# Patient Record
Sex: MALE | Race: WHITE | NOT HISPANIC OR LATINO | Employment: OTHER | URBAN - METROPOLITAN AREA
[De-identification: names, ages, dates, MRNs, and addresses within clinical notes are randomized per-mention and may not be internally consistent; named-entity substitution may affect disease eponyms.]

---

## 2019-02-17 ENCOUNTER — HOSPITAL ENCOUNTER (INPATIENT)
Facility: HOSPITAL | Age: 59
LOS: 1 days | Discharge: ANOTHER HOSPITAL | DRG: 565 | End: 2019-02-18
Attending: EMERGENCY MEDICINE | Admitting: EMERGENCY MEDICINE
Payer: OTHER GOVERNMENT

## 2019-02-17 DIAGNOSIS — C80.1 MALIGNANCY: Primary | ICD-10-CM

## 2019-02-17 DIAGNOSIS — R91.8 LUNG MASS: ICD-10-CM

## 2019-02-17 DIAGNOSIS — M54.50 ACUTE LOW BACK PAIN WITHOUT SCIATICA, UNSPECIFIED BACK PAIN LATERALITY: ICD-10-CM

## 2019-02-17 PROCEDURE — 99285 EMERGENCY DEPT VISIT HI MDM: CPT | Mod: ,,, | Performed by: PHYSICIAN ASSISTANT

## 2019-02-17 PROCEDURE — 99285 EMERGENCY DEPT VISIT HI MDM: CPT | Mod: 25

## 2019-02-17 PROCEDURE — 99285 PR EMERGENCY DEPT VISIT,LEVEL V: ICD-10-PCS | Mod: ,,, | Performed by: PHYSICIAN ASSISTANT

## 2019-02-17 RX ORDER — ZOLPIDEM TARTRATE 5 MG/1
5 TABLET ORAL NIGHTLY PRN
COMMUNITY

## 2019-02-17 RX ORDER — CYCLOBENZAPRINE HCL 5 MG
5 TABLET ORAL 3 TIMES DAILY PRN
COMMUNITY

## 2019-02-18 VITALS
OXYGEN SATURATION: 99 % | SYSTOLIC BLOOD PRESSURE: 192 MMHG | HEIGHT: 72 IN | BODY MASS INDEX: 31.83 KG/M2 | WEIGHT: 235 LBS | RESPIRATION RATE: 18 BRPM | HEART RATE: 90 BPM | DIASTOLIC BLOOD PRESSURE: 122 MMHG | TEMPERATURE: 99 F

## 2019-02-18 PROBLEM — R91.8 MASS OF RIGHT LUNG: Status: ACTIVE | Noted: 2019-02-18

## 2019-02-18 PROBLEM — C80.1 MALIGNANCY: Status: ACTIVE | Noted: 2019-02-18

## 2019-02-18 PROBLEM — M54.9 BACK PAIN: Status: ACTIVE | Noted: 2019-02-18

## 2019-02-18 PROBLEM — E87.1 HYPONATREMIA: Status: ACTIVE | Noted: 2019-02-18

## 2019-02-18 LAB
ABO + RH BLD: NORMAL
ALBUMIN SERPL BCP-MCNC: 3 G/DL
ALP SERPL-CCNC: 88 U/L
ALT SERPL W/O P-5'-P-CCNC: 22 U/L
ANION GAP SERPL CALC-SCNC: 9 MMOL/L
ANION GAP SERPL CALC-SCNC: 9 MMOL/L
APTT BLDCRRT: 22.9 SEC
APTT BLDCRRT: 23.8 SEC
AST SERPL-CCNC: 24 U/L
BASOPHILS # BLD AUTO: 0.07 K/UL
BASOPHILS NFR BLD: 0.6 %
BILIRUB SERPL-MCNC: 0.7 MG/DL
BLD GP AB SCN CELLS X3 SERPL QL: NORMAL
BNP SERPL-MCNC: 16 PG/ML
BUN SERPL-MCNC: 14 MG/DL
BUN SERPL-MCNC: 15 MG/DL
CALCIUM SERPL-MCNC: 8.9 MG/DL
CALCIUM SERPL-MCNC: 8.9 MG/DL
CHLORIDE SERPL-SCNC: 100 MMOL/L
CHLORIDE SERPL-SCNC: 100 MMOL/L
CK SERPL-CCNC: 305 U/L
CO2 SERPL-SCNC: 22 MMOL/L
CO2 SERPL-SCNC: 22 MMOL/L
CREAT SERPL-MCNC: 0.8 MG/DL
CREAT SERPL-MCNC: 0.8 MG/DL
DIFFERENTIAL METHOD: ABNORMAL
EOSINOPHIL # BLD AUTO: 0.1 K/UL
EOSINOPHIL NFR BLD: 0.5 %
ERYTHROCYTE [DISTWIDTH] IN BLOOD BY AUTOMATED COUNT: 13.2 %
EST. GFR  (AFRICAN AMERICAN): >60 ML/MIN/1.73 M^2
EST. GFR  (AFRICAN AMERICAN): >60 ML/MIN/1.73 M^2
EST. GFR  (NON AFRICAN AMERICAN): >60 ML/MIN/1.73 M^2
EST. GFR  (NON AFRICAN AMERICAN): >60 ML/MIN/1.73 M^2
ESTIMATED AVG GLUCOSE: 108 MG/DL
FOLATE SERPL-MCNC: 11.8 NG/ML
GLUCOSE SERPL-MCNC: 88 MG/DL
GLUCOSE SERPL-MCNC: 96 MG/DL
HBA1C MFR BLD HPLC: 5.4 %
HCT VFR BLD AUTO: 41.7 %
HGB BLD-MCNC: 13.5 G/DL
IMM GRANULOCYTES # BLD AUTO: 0.05 K/UL
IMM GRANULOCYTES NFR BLD AUTO: 0.4 %
INR PPP: 1
INR PPP: 1
LACTATE SERPL-SCNC: 1.1 MMOL/L
LYMPHOCYTES # BLD AUTO: 1.9 K/UL
LYMPHOCYTES NFR BLD: 15.2 %
MCH RBC QN AUTO: 27.6 PG
MCHC RBC AUTO-ENTMCNC: 32.4 G/DL
MCV RBC AUTO: 85 FL
MONOCYTES # BLD AUTO: 0.8 K/UL
MONOCYTES NFR BLD: 6.1 %
NEUTROPHILS # BLD AUTO: 9.5 K/UL
NEUTROPHILS NFR BLD: 77.2 %
NRBC BLD-RTO: 0 /100 WBC
OSMOLALITY SERPL: 278 MOSM/KG
PLATELET # BLD AUTO: 242 K/UL
PMV BLD AUTO: 10.3 FL
POCT GLUCOSE: 96 MG/DL (ref 70–110)
POTASSIUM SERPL-SCNC: 3.5 MMOL/L
POTASSIUM SERPL-SCNC: 3.8 MMOL/L
PROCALCITONIN SERPL IA-MCNC: 0.03 NG/ML
PROT SERPL-MCNC: 6.8 G/DL
PROTHROMBIN TIME: 10.1 SEC
PROTHROMBIN TIME: 10.2 SEC
RBC # BLD AUTO: 4.9 M/UL
SODIUM SERPL-SCNC: 131 MMOL/L
SODIUM SERPL-SCNC: 131 MMOL/L
SODIUM UR-SCNC: 54 MMOL/L
TSH SERPL DL<=0.005 MIU/L-ACNC: 3.44 UIU/ML
VIT B12 SERPL-MCNC: 497 PG/ML
WBC # BLD AUTO: 12.24 K/UL

## 2019-02-18 PROCEDURE — 82550 ASSAY OF CK (CPK): CPT

## 2019-02-18 PROCEDURE — 93005 ELECTROCARDIOGRAM TRACING: CPT

## 2019-02-18 PROCEDURE — 82746 ASSAY OF FOLIC ACID SERUM: CPT

## 2019-02-18 PROCEDURE — 83605 ASSAY OF LACTIC ACID: CPT

## 2019-02-18 PROCEDURE — 80053 COMPREHEN METABOLIC PANEL: CPT

## 2019-02-18 PROCEDURE — 83880 ASSAY OF NATRIURETIC PEPTIDE: CPT

## 2019-02-18 PROCEDURE — 36415 COLL VENOUS BLD VENIPUNCTURE: CPT

## 2019-02-18 PROCEDURE — 99223 1ST HOSP IP/OBS HIGH 75: CPT | Mod: ,,, | Performed by: NEUROLOGICAL SURGERY

## 2019-02-18 PROCEDURE — 20600001 HC STEP DOWN PRIVATE ROOM

## 2019-02-18 PROCEDURE — 97162 PT EVAL MOD COMPLEX 30 MIN: CPT

## 2019-02-18 PROCEDURE — 25500020 PHARM REV CODE 255: Performed by: EMERGENCY MEDICINE

## 2019-02-18 PROCEDURE — 82607 VITAMIN B-12: CPT

## 2019-02-18 PROCEDURE — 84443 ASSAY THYROID STIM HORMONE: CPT

## 2019-02-18 PROCEDURE — 83930 ASSAY OF BLOOD OSMOLALITY: CPT

## 2019-02-18 PROCEDURE — A9585 GADOBUTROL INJECTION: HCPCS | Performed by: EMERGENCY MEDICINE

## 2019-02-18 PROCEDURE — 93010 EKG 12-LEAD: ICD-10-PCS | Mod: ,,, | Performed by: INTERNAL MEDICINE

## 2019-02-18 PROCEDURE — 99223 PR INITIAL HOSPITAL CARE,LEVL III: ICD-10-PCS | Mod: ,,, | Performed by: INTERNAL MEDICINE

## 2019-02-18 PROCEDURE — 80048 BASIC METABOLIC PNL TOTAL CA: CPT

## 2019-02-18 PROCEDURE — 85025 COMPLETE CBC W/AUTO DIFF WBC: CPT

## 2019-02-18 PROCEDURE — 85730 THROMBOPLASTIN TIME PARTIAL: CPT

## 2019-02-18 PROCEDURE — 25000003 PHARM REV CODE 250: Performed by: STUDENT IN AN ORGANIZED HEALTH CARE EDUCATION/TRAINING PROGRAM

## 2019-02-18 PROCEDURE — 82962 GLUCOSE BLOOD TEST: CPT

## 2019-02-18 PROCEDURE — 85610 PROTHROMBIN TIME: CPT

## 2019-02-18 PROCEDURE — 93010 ELECTROCARDIOGRAM REPORT: CPT | Mod: ,,, | Performed by: INTERNAL MEDICINE

## 2019-02-18 PROCEDURE — 86901 BLOOD TYPING SEROLOGIC RH(D): CPT

## 2019-02-18 PROCEDURE — 85730 THROMBOPLASTIN TIME PARTIAL: CPT | Mod: 91

## 2019-02-18 PROCEDURE — 84300 ASSAY OF URINE SODIUM: CPT

## 2019-02-18 PROCEDURE — 99223 PR INITIAL HOSPITAL CARE,LEVL III: ICD-10-PCS | Mod: ,,, | Performed by: NEUROLOGICAL SURGERY

## 2019-02-18 PROCEDURE — 83036 HEMOGLOBIN GLYCOSYLATED A1C: CPT

## 2019-02-18 PROCEDURE — 84145 PROCALCITONIN (PCT): CPT

## 2019-02-18 PROCEDURE — 99223 1ST HOSP IP/OBS HIGH 75: CPT | Mod: ,,, | Performed by: INTERNAL MEDICINE

## 2019-02-18 PROCEDURE — 85610 PROTHROMBIN TIME: CPT | Mod: 91

## 2019-02-18 RX ORDER — HYDRALAZINE HYDROCHLORIDE 25 MG/1
25 TABLET, FILM COATED ORAL ONCE
Status: COMPLETED | OUTPATIENT
Start: 2019-02-18 | End: 2019-02-18

## 2019-02-18 RX ORDER — ZOLPIDEM TARTRATE 5 MG/1
5 TABLET ORAL NIGHTLY PRN
Status: DISCONTINUED | OUTPATIENT
Start: 2019-02-18 | End: 2019-02-18 | Stop reason: HOSPADM

## 2019-02-18 RX ORDER — GADOBUTROL 604.72 MG/ML
10 INJECTION INTRAVENOUS
Status: COMPLETED | OUTPATIENT
Start: 2019-02-18 | End: 2019-02-18

## 2019-02-18 RX ORDER — IBUPROFEN 200 MG
16 TABLET ORAL
Status: DISCONTINUED | OUTPATIENT
Start: 2019-02-18 | End: 2019-02-18 | Stop reason: HOSPADM

## 2019-02-18 RX ORDER — PROMETHAZINE HYDROCHLORIDE 25 MG/1
25 TABLET ORAL EVERY 6 HOURS PRN
Status: DISCONTINUED | OUTPATIENT
Start: 2019-02-18 | End: 2019-02-18 | Stop reason: HOSPADM

## 2019-02-18 RX ORDER — ONDANSETRON 8 MG/1
8 TABLET, ORALLY DISINTEGRATING ORAL EVERY 8 HOURS PRN
Status: DISCONTINUED | OUTPATIENT
Start: 2019-02-18 | End: 2019-02-18 | Stop reason: HOSPADM

## 2019-02-18 RX ORDER — SODIUM CHLORIDE 0.9 % (FLUSH) 0.9 %
5 SYRINGE (ML) INJECTION
Status: CANCELLED | OUTPATIENT
Start: 2019-02-18

## 2019-02-18 RX ORDER — IBUPROFEN 200 MG
24 TABLET ORAL
Status: DISCONTINUED | OUTPATIENT
Start: 2019-02-18 | End: 2019-02-18 | Stop reason: HOSPADM

## 2019-02-18 RX ORDER — ACETAMINOPHEN 325 MG/1
650 TABLET ORAL EVERY 4 HOURS PRN
Status: DISCONTINUED | OUTPATIENT
Start: 2019-02-18 | End: 2019-02-18 | Stop reason: HOSPADM

## 2019-02-18 RX ORDER — ENOXAPARIN SODIUM 100 MG/ML
40 INJECTION SUBCUTANEOUS EVERY 24 HOURS
Status: DISCONTINUED | OUTPATIENT
Start: 2019-02-18 | End: 2019-02-18 | Stop reason: HOSPADM

## 2019-02-18 RX ORDER — GLUCAGON 1 MG
1 KIT INJECTION
Status: DISCONTINUED | OUTPATIENT
Start: 2019-02-18 | End: 2019-02-18 | Stop reason: HOSPADM

## 2019-02-18 RX ORDER — LIDOCAINE 50 MG/G
1 PATCH TOPICAL
COMMUNITY

## 2019-02-18 RX ORDER — SODIUM CHLORIDE 0.9 % (FLUSH) 0.9 %
5 SYRINGE (ML) INJECTION
Status: DISCONTINUED | OUTPATIENT
Start: 2019-02-18 | End: 2019-02-18 | Stop reason: HOSPADM

## 2019-02-18 RX ORDER — CYCLOBENZAPRINE HCL 5 MG
5 TABLET ORAL 3 TIMES DAILY PRN
Status: DISCONTINUED | OUTPATIENT
Start: 2019-02-18 | End: 2019-02-18 | Stop reason: HOSPADM

## 2019-02-18 RX ORDER — OXYCODONE HYDROCHLORIDE 5 MG/1
5 TABLET ORAL EVERY 6 HOURS PRN
Status: DISCONTINUED | OUTPATIENT
Start: 2019-02-18 | End: 2019-02-18 | Stop reason: HOSPADM

## 2019-02-18 RX ORDER — ACETAMINOPHEN 500 MG
500 TABLET ORAL DAILY PRN
COMMUNITY

## 2019-02-18 RX ADMIN — GADOBUTROL 10 ML: 604.72 INJECTION INTRAVENOUS at 03:02

## 2019-02-18 RX ADMIN — HYDRALAZINE HYDROCHLORIDE 25 MG: 25 TABLET ORAL at 07:02

## 2019-02-18 RX ADMIN — OXYCODONE HYDROCHLORIDE 5 MG: 5 TABLET ORAL at 05:02

## 2019-02-18 NOTE — PLAN OF CARE
Patient admitted to unit from Emergency Room alter and oriented x 4 in stable condition. Pt reports lower backache but declined intervention at this time. Plan is to transfer patient to [facility] once this is set up. Up with assist x 2 patient very unsteady on feet, knees will sometimes buckle and he has issues lifting his feet to walk at this time r/t disease. Pt has remained free from injury this shift. Bed in low locked position. Call light and personal belongings within reach. Side rails up x2. Nonskid socks in place. Pt instructed to call with any needs. Will continue to monitor.     Addendum: Hydralazine adm per ords x 1; patient transported in stable condition to outside facility. Report called to [facility] [phone].

## 2019-02-18 NOTE — HOSPITAL COURSE
Patient was seen and evaluated by NSGY [facility] and plan is: No acute neurosurgical intervention necessary as the MRI L spine obtained at [facility] was without compressive lesion.  Patient also seen and evaluated by Pulmonology expressed his wish to resume care for his pulmonary mass with biopsy in [place]. The patient was then transferred back to his original hospital..

## 2019-02-18 NOTE — ASSESSMENT & PLAN NOTE
Incidental lung mass identified on CT-spine at OSH 1 week ago, xfer to [facility] for NSGY eval in the setting of acute onset b/l lower limb paresthesia and weakness  - Paraspinal L3 lesion noted on MRI-spine, NSGY states no current concern for cord compression  - 6mm R iliac enhancing osseous lesion also noted on MRI  - Not yet biopsy proven cancer dx, though high concern for SCLC  - If cord compression has been ruled out, lower limb symptomatology may be 2/2 paraneoplastic syndrome    A/P  - R-sided lung mass, likely SCLC primary vs other malignancy  - Pt will requiring dedicated CT chest for further characterization of mass and lung biopsy planning, peripheral iliac lesion too small for biopsy  - In light of patient's  health coverage, follow up testing already scheduled in [place] with his primary doctors (biopsy planned for this Wednesday), and patient's preference to be treated closer to home, pt will return to [place] for workup and tx of his right lung mass once medically cleared by nsgy and primary to do so.  - Consider Lambert-Eaton Myasthenic syndrome as ddx of neurological symptoms in absence of cord compressing lesion

## 2019-02-18 NOTE — PT/OT/SLP EVAL
Physical Therapy Evaluation    Patient Name:  Gurinder Tee   MRN:  [id number]    Recommendations:     Discharge Recommendations:  rehabilitation facility   Discharge Equipment Recommendations: walker, rolling   Barriers to discharge: None    Assessment:     Gurinder Tee is a 59 y.o. male admitted with a medical diagnosis of Back pain.  He presents with the following impairments/functional limitations:  weakness, impaired endurance, gait instability, impaired functional mobilty, decreased coordination, impaired balance, decreased lower extremity function, impaired self care skills, impaired sensation, decreased safety awareness, impaired cardiopulmonary response to activity.    During today's evaluation, pt with poor coordination and sensation effecting pt stability during gait. Pt with uncoordinated gait pattern and heavy reliance on RW for stability. Prior to admit, pt (I) with mobility and self care; still working for [employer]. Currently pt is high fall risk and would benefit from intensive rehabilitation program to address deficits and to improve return to PLOF.     Rehab Prognosis: Good; patient would benefit from acute skilled PT services to address these deficits and reach maximum level of function.    Recent Surgery: * No surgery found *      Plan:     During this hospitalization, patient to be seen 4 x/week to address the identified rehab impairments via therapeutic activities, therapeutic exercises, neuromuscular re-education, gait training and progress toward the following goals:    · Plan of Care Expires:  03/20/19    Subjective     Chief Complaint: lower back pain   Patient/Family Comments/goals: to improve mobility  Pain/Comfort:  · Pain Rating 1: (did not rate)  · Location - Orientation 1: lower  · Location 1: back  · Pain Addressed 1: Reposition, Distraction    Patients cultural, spiritual, Denominational conflicts given the current situation: no    Living Environment:  Patient History:  · Home  environment: lives with wife in 2 story home with no SHERRI; able to live on 1st level  · Assistance provided:  wife      Previous Level of Mobilty  · Transfers: (I)  · Gait: (I) for household and community distances    Additional Roles and Hx of Patient  · Working: yes; [employer] desk job  · Driving: yes  · Hobbies:did not state  · Hearing or Vision Deficit: wears glasses; no hearing deficit  · Hx of Falls: 1 prior to admit    DME: none  - Bold implies equipment being used    Objective:     Communicated with nsg prior to session.  Patient found supine in bed peripheral IV  upon PT entry to room.    General Precautions: Standard, fall   Orthopedic Precautions:N/A   Braces: N/A       Exams:  Cognitive Exam  Patient is oriented to Person, Place, Time and Situation and follows 100 % of multi-step commands    Fine Motor Coordination    -       Impaired  RLE heel shin moderate and LLE heel shin mild   Postural Exam Patient presented with the following abnormalities:    -       Rounded shoulders   Sensation    -       Intact  light/touch to proximal of (B) LE  -       Impaired  light/touch to distal of (B) LE specifically feet   Skin Integrity/Edema     -       Skin integrity: Visible skin intact   R LE ROM WFL   R LE Strength  3+/5 hip flexion, 4+/5 knee ext, 4/5 knee flex, 3+/5 DF   L LE ROM WFL   L LE Strength  4-/5 hip flexion, 4/5 knee ext, 4+/5 knee flex and ankle DF       Functional Mobility  Bed Mobility  Supine to Sit: contact guard assistance   Sit to Supine: contact guard assistance   Transfers Sit to Stand:  contact guard assistance and minimum assistance with rolling walker for 4 trials  - 1 trial MIN A, 3 trials CGA; heavy reliance on RW      Gait Gait Distance: 5 ft, 20 ft x 2 with RW  Assistance Level: minimum assistance  Description: incr keke with (B) foot slap occurring and pt naturally with narrow base of support. Cueing provided to incr weight-shifting and base of support. Cueing also provided for  improved coordination of placement of feet. Heavy reliance on RW while ambulating to offset weakness         Balance   Static Sitting stand by assistance   Dynamic Sitting contact guard assistance   Static Standing minimum assistance   PT assessed multiple higher level balance activities  - perturbations (FWD, BWD, sideways)  - feet together eyes closed  - narrow base of support  - *LOB denoted with bolded items  CGA req for all activities for safety      Dynamic Standing minimum assistance         Therapeutic Activities and Exercises:    PT educated pt on the following  - role of PT  - PT POC (including frequency and duration while in hospital)  - discharge recommendation (IP REHAB) and equipment needs (RW)  - level of assistance currently req (1 person )and safety precautions with [facility] staff   All questions and concerns answered and addressed. White board updated with pertinent information. Nsg notified.       AM-PAC 6 CLICK MOBILITY  Total Score:16     Patient left supine with all lines intact and call button in reach.    GOALS:   Multidisciplinary Problems     Physical Therapy Goals        Problem: Physical Therapy Goal    Goal Priority Disciplines Outcome Goal Variances Interventions   Physical Therapy Goal     PT, PT/OT Ongoing (interventions implemented as appropriate)     Description:  Goals to be met by: 10 days ()    Patient will increase functional independence with mobility by performin. Supine to sit with Modified [place]  2. Sit to supine with Modified [place]  3. Sit to stand transfer with Supervision  4. Gait  x 200 feet with Supervision using Rolling Walker.   5. Lower extremity exercise program x30 reps per handout, with independence to improve muscular strength and endurance.                       History:     Past Medical History:   Diagnosis Date    Asthma        History reviewed. No pertinent surgical history.    Time Tracking:     PT Received On: 19  PT Start Time:  1100     PT Stop Time: 1120  PT Total Time (min): 20 min     Billable Minutes: Evaluation 20      Melissa Reynaga, PT  02/18/2019

## 2019-02-18 NOTE — CONSULTS
"[facility]  Pulmonology  Consult Note    Patient Name: Gruinder Tee  MRN: [id number]  Admission Date: 2/17/2019  Hospital Length of Stay: 0 days  Code Status: No Order  Attending Physician: Quinton Louie MD  Primary Care Provider: Primary Doctor No   Principal Problem: Back pain    Consults  Subjective:     HPI:  59M who is a 15 pack-yr ex-smoker (quit 20 yrs ago), otherwise no PMHx, presented to OSH 1wk ago for evaluation of 2wk hx back pain/spasms. A CT-spine and MRI-spine were performed and demonstrated an incidental R eleazar-hilar lung mass with L3 spinal lesion concerning for lung cancer with bony metastasis. He has been transferred from [place], [place] for NSGY evaluation of spinal lesion with new onset progressive lower limb paraesthesia and weakness concerning for spinal cord compression. Pt maintains full control of bowel and bladder, though does endorse numbness of legs b/l, and partial paresthesia extending proximally to L3 with difficulty ambulating. Denies any respiratory symptoms, f/c, night sweats, or weight loss, though had a few day course of "bronchitis" with intermittent cough a couple weeks ago that has since fully resolved. Pt has not yet received biopsy to confirm dx.     Pulmonary consulted for new diagnosis lung cancer    Past Medical History:   Diagnosis Date    Asthma        History reviewed. No pertinent surgical history.    Review of patient's allergies indicates:   Allergen Reactions    Penicillins Rash       Family History     Problem Relation (Age of Onset)    Cancer Father    Heart disease Father        Tobacco Use    Smoking status: Former Smoker     Types: Cigarettes    Smokeless tobacco: Never Used    Tobacco comment: quit 20 yrs ago   Substance and Sexual Activity    Alcohol use: Yes     Comment: socially    Drug use: No    Sexual activity: Not on file         Review of Systems   Constitutional: Negative for chills and fever.   HENT: Negative for " congestion, postnasal drip, rhinorrhea and sore throat.    Eyes: Negative for photophobia and visual disturbance.   Respiratory: Negative for cough, chest tightness, shortness of breath, wheezing and stridor.    Cardiovascular: Negative for chest pain, palpitations and leg swelling.   Gastrointestinal: Negative for abdominal distention, abdominal pain, constipation, diarrhea, nausea and vomiting.   Endocrine: Negative for polyuria.   Genitourinary: Negative for decreased urine volume, difficulty urinating, dysuria, flank pain, hematuria and urgency.   Musculoskeletal: Positive for back pain. Negative for myalgias, neck pain and neck stiffness.   Skin: Negative for color change and rash.   Neurological: Positive for weakness and numbness. Negative for dizziness, syncope, light-headedness and headaches.   Hematological: Does not bruise/bleed easily.   Psychiatric/Behavioral: Negative for agitation, confusion and decreased concentration.     Objective:     Vital Signs (Most Recent):  Temp: 98 °F (36.7 °C) (02/18/19 0908)  Pulse: 85 (02/18/19 0908)  Resp: 18 (02/18/19 0908)  BP: (!) 171/96 (02/18/19 0908)  SpO2: 100 % (02/18/19 0908) Vital Signs (24h Range):  Temp:  [98 °F (36.7 °C)-98.7 °F (37.1 °C)] 98 °F (36.7 °C)  Pulse:  [78-86] 85  Resp:  [16-18] 18  SpO2:  [95 %-100 %] 100 %  BP: (119-182)/() 171/96     Weight: 106.6 kg (235 lb)  Body mass index is 31.87 kg/m².    No intake or output data in the 24 hours ending 02/18/19 0920    Physical Exam   Constitutional: He is oriented to person, place, and time. He appears well-developed and well-nourished. No distress.   HENT:   Head: Normocephalic and atraumatic.   Eyes: Conjunctivae are normal. No scleral icterus.   Neck: Normal range of motion. Neck supple. No JVD present.   Cardiovascular: Normal rate, regular rhythm, normal heart sounds and intact distal pulses.   Pulmonary/Chest: Effort normal and breath sounds normal. No respiratory distress. He has no  wheezes.   Abdominal: Soft. Bowel sounds are normal.   Musculoskeletal: He exhibits tenderness. He exhibits no edema.   Neurological: He is alert and oriented to person, place, and time. A sensory deficit is present.   Skin: Skin is warm and dry. He is not diaphoretic.   Vitals reviewed.      Vents:       Lines/Drains/Airways     Peripheral Intravenous Line                 Peripheral IV - Single Lumen 02/18/19 0100 Left Antecubital less than 1 day                Significant Labs:    CBC/Anemia Profile:  Recent Labs   Lab 02/18/19  0139 02/18/19  0557   WBC 12.24  --    HGB 13.5*  --    HCT 41.7  --      --    MCV 85  --    RDW 13.2  --    FOLATE  --  11.8   [id number]  --  497        Chemistries:  Recent Labs   Lab 02/18/19  0139   *   K 3.5      CO2 22*   BUN 15   CREATININE 0.8   CALCIUM 8.9   ALBUMIN 3.0*   PROT 6.8   BILITOT 0.7   ALKPHOS 88   ALT 22   AST 24       Recent Lab Results       02/18/19  0650   02/18/19  0602   02/18/19  0557   02/18/19 0139        Immature Granulocytes       0.4     Immature Grans (Abs)       0.05  Comment:  Mild elevation in immature granulocytes is non specific and   can be seen in a variety of conditions including stress response,   acute inflammation, trauma and pregnancy. Correlation with other   laboratory and clinical findings is essential.       Procalcitonin     0.03  Comment:  A concentration < 0.25 ng/mL represents a low risk bacterial   infection.  Procalcitonin may not be accurate among patients with localized   infection, recent trauma or major surgery, immunosuppressed state,   invasive fungal infection, renal dysfunction. Decisions regarding   initiation or continuation of antibiotic therapy should not be based   solely on procalcitonin levels.         Albumin       3.0     Alkaline Phosphatase       88     ALT       22     Anion Gap       9     aPTT     23.8  Comment:  aPTT therapeutic range = 39-69 seconds 22.9  Comment:  aPTT therapeutic range  = 39-69 seconds     AST       24     Baso #       0.07     Basophil%       0.6     Total Bilirubin       0.7  Comment:  For infants and newborns, interpretation of results should be based  on gestational age, weight and in agreement with clinical  observations.  Premature Infant recommended reference ranges:  Up to 24 hours.............<8.0 mg/dL  Up to 48 hours............<12.0 mg/dL  3-5 days..................<15.0 mg/dL  6-29 days.................<15.0 mg/dL       BNP     16  Comment:  Values of less than 100 pg/ml are consistent with non-CHF populations.       BUN, Bld       15     Calcium       8.9     Chloride       100     CO2       22     CPK     305       Creatinine       0.8     Differential Method       Automated     eGFR if        >60.0     eGFR if non        >60.0  Comment:  Calculation used to obtain the estimated glomerular filtration  rate (eGFR) is the CKD-EPI equation.        Eos #       0.1     Eosinophil%       0.5     Estimated Avg Glucose     108       Folate     11.8       Glucose       96     Gran # (ANC)       9.5     Gran%       77.2     Group & Rh       A POS     Hematocrit       41.7     Hemoglobin       13.5     Hemoglobin A1C External     5.4  Comment:  ADA Screening Guidelines:  5.7-6.4%  Consistent with prediabetes  >or=6.5%  Consistent with diabetes  High levels of fetal hemoglobin interfere with the HbA1C  assay. Heterozygous hemoglobin variants (HbS, HgC, etc)do  not significantly interfere with this assay.   However, presence of multiple variants may affect accuracy.         INDIRECT NIC       NEG     Coumadin Monitoring INR     1.0  Comment:  Coumadin Therapy:  2.0 - 3.0 for INR for all indicators except mechanical heart valves  and antiphospholipid syndromes which should use 2.5 - 3.5.   1.0  Comment:  Coumadin Therapy:  2.0 - 3.0 for INR for all indicators except mechanical heart valves  and antiphospholipid syndromes which should use 2.5 -  3.5.       Lactate, Ryan     1.1  Comment:  Falsely low lactic acid results can be found in samples   containing >=13.0 mg/dL total bilirubin and/or >=3.5 mg/dL   direct bilirubin.         Lymph #       1.9     Lymph%       15.2     MCH       27.6     MCHC       32.4     MCV       85     Mono #       0.8     Mono%       6.1     MPV       10.3     nRBC       0     Osmolality     278       Platelets       242     POCT Glucose   96         Potassium       3.5     Total Protein       6.8     Protime     10.2 10.1     RBC       4.90     RDW       13.2     Sodium       131     Sodium Urine Random 54  Comment:  The random urine reference ranges provided were established   for 24 hour urine collections.  No reference ranges exist for  random urine specimens.  Correlate clinically.             TSH     3.440       Vitamin B-12     497       WBC       12.24         All pertinent labs within the past 24 hours have been reviewed.    Significant Imaging:   I have reviewed all pertinent imaging results/findings within the past 24 hours.    Assessment/Plan:     Mass of right lung    Incidental lung mass identified on CT-spine at OSH 1 week ago, xfer to [facility] for NSGY eval in the setting of acute onset b/l lower limb paresthesia and weakness  - Paraspinal L3 lesion noted on MRI-spine, NSGY states no current concern for cord compression  - 6mm R iliac enhancing osseous lesion also noted on MRI  - Not yet biopsy proven cancer dx, though high concern for SCLC  - If cord compression has been ruled out, lower limb symptomatology may be 2/2 paraneoplastic syndrome    A/P  - R-sided lung mass, likely SCLC primary vs other malignancy  - Pt will requiring dedicated CT chest for further characterization of mass and lung biopsy planning, peripheral iliac lesion too small for biopsy  - In light of patient's  health coverage, follow up testing already scheduled in [place] with his primary doctors (biopsy planned for this Wednesday), and  patient's preference to be treated closer to home, pt will return to [place] for workup and tx of his right lung mass once medically cleared by nsgy and primary to do so.  - Consider Lambert-Eaton Myasthenic syndrome as ddx of neurological symptoms in absence of cord compressing lesion           Thank you for your consult. I will sign off. Please contact us if you have any additional questions.     Nicola Gustafson MD  Pulmonology  [facility]

## 2019-02-18 NOTE — H&P
[facility] Hospital Medicine  History & Physical    Patient Name: Gurinder Tee  MRN: [id number]  Admission Date: 2019  Attending Physician: Qiunton Louie MD   Primary Care Provider: No primary care provider on file.    Hospital Medicine Team: [facility] MED 3 Keeley Bajwa MD     Patient information was obtained from patient, past medical records and ER records.     Subjective:     Principal Problem:Back pain    Chief Complaint:   Chief Complaint   Patient presents with    Fatigue     Patient transferred from [facility] . Pt diagnosed with Lung Ca with mets 1 week ago. Pt also has an L3 mass        HPI: 59-year-old male with no known past medical history who presents as a transfer from [facility] in [place] for neurosurgical evaluation. Patient reports he began having lower back spasms starting 1-2 weeks ago; he had a CT of his spine which revealed an incidental right lung mass. He was given Tramadol and Flexeril for back pain. He states on Tuesday (6 days ago) he was attending his follow-up consultation with a pulmonologist when he noted gait instability and lower extremity numbness. An MRI of the spine was done demonstrating a lesion at L3. He states since then he has required more assistance with ambulation and has numbness from the lumbar spine to his toes. Now he can not stand without holding onto something. He denies arm weakness or numbness, bowel/bladder incontinence, back pain, facial droop, speech difficulty, chest pain, nausea, vomiting, or dysuria.    Smoking History:  Patient quitted smoking for 20 years. He has been a smoker for 20 years    Family History:  His maternal grandfather  of lung cancer.    History reviewed. No pertinent past medical history.    History reviewed. No pertinent surgical history.    Review of patient's allergies indicates:   Allergen Reactions    Penicillins Rash       No current facility-administered medications on file prior to encounter.       Current Outpatient Medications on File Prior to Encounter   Medication Sig    cyclobenzaprine (FLEXERIL) 5 MG tablet Take 5 mg by mouth 3 (three) times daily as needed for Muscle spasms.    tramadol (ULTRAM) 25 mg tablet Take by mouth every 6 (six) hours as needed for Pain.    zolpidem (AMBIEN) 5 MG Tab Take 5 mg by mouth nightly as needed.     Family History     None        Tobacco Use    Smoking status: Former Smoker     Types: Cigarettes    Tobacco comment: quit 20 yrs ago   Substance and Sexual Activity    Alcohol use: Yes     Comment: socially    Drug use: No    Sexual activity: Not on file     Review of Systems   Constitutional: Positive for activity change and fatigue. Negative for appetite change, chills, diaphoresis, fever and unexpected weight change.   HENT: Negative for sore throat and trouble swallowing.    Eyes: Negative for photophobia, pain, discharge, redness, itching and visual disturbance.   Respiratory: Negative for cough, choking, chest tightness, shortness of breath, wheezing and stridor.    Cardiovascular: Negative for chest pain and leg swelling.   Gastrointestinal: Positive for constipation. Negative for abdominal pain, diarrhea, nausea and vomiting.   Genitourinary: Negative for dysuria and hematuria.        Has the urge to pass urine. Takes longer than usual to pass urine   Neurological: Positive for dizziness, weakness, light-headedness and numbness. Negative for seizures, syncope, speech difficulty and headaches.   Psychiatric/Behavioral: Negative for confusion.     Objective:     Vital Signs (Most Recent):  Temp: 98 °F (36.7 °C) (02/17/19 2301)  Pulse: 78 (02/18/19 0141)  BP: (!) 119/90 (02/17/19 2337)  SpO2: 95 % (02/18/19 0141) Vital Signs (24h Range):  Temp:  [98 °F (36.7 °C)-98.7 °F (37.1 °C)] 98 °F (36.7 °C)  Pulse:  [78-86] 78  Resp:  [16] 16  SpO2:  [95 %-100 %] 95 %  BP: (119-168)/(90-98) 119/90        There is no height or weight on file to calculate  BMI.    Physical Exam   Constitutional: He is oriented to person, place, and time. He appears well-developed and well-nourished. No distress.   HENT:   Head: Normocephalic and atraumatic.   Eyes: Right eye exhibits no discharge. Left eye exhibits no discharge.   Neck: Neck supple. No JVD present.   Cardiovascular: Normal rate and regular rhythm. Exam reveals no gallop and no friction rub.   No murmur heard.  Pulmonary/Chest: Effort normal and breath sounds normal. No stridor. No respiratory distress. He has no wheezes. He has no rales. He exhibits no tenderness.   Abdominal: Soft. Bowel sounds are normal. There is no tenderness.   Musculoskeletal: He exhibits no edema, tenderness or deformity.   Neurological: He is alert and oriented to person, place, and time.   Hypoesthesia noted from the level of the lumbar spine to bilateral legs.   No saddle anesthesia.  Normal finger-to-nose, unable to perform heel-to-shin 2/2 weakness.   BLE pronator drift.   5/5 dorsi/plantarflexion, knee flexion bilaterally.   Skin: He is not diaphoretic.   Psychiatric: He has a normal mood and affect. His behavior is normal.           Significant Labs:   A1C:   Recent Labs   Lab 02/18/19  0557   HGBA1C 5.4     Bilirubin:   Recent Labs   Lab 02/18/19  0139   BILITOT 0.7     BMP:   Recent Labs   Lab 02/18/19  0139   GLU 96   *   K 3.5      CO2 22*   BUN 15   CREATININE 0.8   CALCIUM 8.9     CBC:   Recent Labs   Lab 02/18/19 0139   WBC 12.24   HGB 13.5*   HCT 41.7        CMP:   Recent Labs   Lab 02/18/19  0139   *   K 3.5      CO2 22*   GLU 96   BUN 15   CREATININE 0.8   CALCIUM 8.9   PROT 6.8   ALBUMIN 3.0*   BILITOT 0.7   ALKPHOS 88   AST 24   ALT 22   ANIONGAP 9   EGFRNONAA >60.0     Cardiac Markers:   Recent Labs   Lab 02/18/19  0557   BNP 16     Coagulation:   Recent Labs   Lab 02/18/19 0557   INR 1.0   APTT 23.8     Lactic Acid:   Recent Labs   Lab 02/18/19  0557   LACTATE 1.1       Pathology Results   (Last 10 years)    None        POCT Glucose:   Recent Labs   Lab 02/18/19  0602   POCTGLUCOSE 96     TSH:   Recent Labs   Lab 02/18/19  0557   TSH 3.440       Recent Lab Results       02/18/19  0650   02/18/19  0602   02/18/19  0557   02/18/19  0139        Immature Granulocytes       0.4     Immature Grans (Abs)       0.05  Comment:  Mild elevation in immature granulocytes is non specific and   can be seen in a variety of conditions including stress response,   acute inflammation, trauma and pregnancy. Correlation with other   laboratory and clinical findings is essential.       Procalcitonin     0.03  Comment:  A concentration < 0.25 ng/mL represents a low risk bacterial   infection.  Procalcitonin may not be accurate among patients with localized   infection, recent trauma or major surgery, immunosuppressed state,   invasive fungal infection, renal dysfunction. Decisions regarding   initiation or continuation of antibiotic therapy should not be based   solely on procalcitonin levels.         Albumin       3.0     Alkaline Phosphatase       88     ALT       22     Anion Gap       9     aPTT     23.8  Comment:  aPTT therapeutic range = 39-69 seconds 22.9  Comment:  aPTT therapeutic range = 39-69 seconds     AST       24     Baso #       0.07     Basophil%       0.6     Total Bilirubin       0.7  Comment:  For infants and newborns, interpretation of results should be based  on gestational age, weight and in agreement with clinical  observations.  Premature Infant recommended reference ranges:  Up to 24 hours.............<8.0 mg/dL  Up to 48 hours............<12.0 mg/dL  3-5 days..................<15.0 mg/dL  6-29 days.................<15.0 mg/dL       BNP     16  Comment:  Values of less than 100 pg/ml are consistent with non-CHF populations.       BUN, Bld       15     Calcium       8.9     Chloride       100     CO2       22     CPK     305       Creatinine       0.8     Differential Method       Automated     eGFR  if        >60.0     eGFR if non        >60.0  Comment:  Calculation used to obtain the estimated glomerular filtration  rate (eGFR) is the CKD-EPI equation.        Eos #       0.1     Eosinophil%       0.5     Estimated Avg Glucose     108       Glucose       96     Gran # (ANC)       9.5     Gran%       77.2     Group & Rh       A POS     Hematocrit       41.7     Hemoglobin       13.5     Hemoglobin A1C External     5.4  Comment:  ADA Screening Guidelines:  5.7-6.4%  Consistent with prediabetes  >or=6.5%  Consistent with diabetes  High levels of fetal hemoglobin interfere with the HbA1C  assay. Heterozygous hemoglobin variants (HbS, HgC, etc)do  not significantly interfere with this assay.   However, presence of multiple variants may affect accuracy.         INDIRECT NIC       NEG     Coumadin Monitoring INR     1.0  Comment:  Coumadin Therapy:  2.0 - 3.0 for INR for all indicators except mechanical heart valves  and antiphospholipid syndromes which should use 2.5 - 3.5.   1.0  Comment:  Coumadin Therapy:  2.0 - 3.0 for INR for all indicators except mechanical heart valves  and antiphospholipid syndromes which should use 2.5 - 3.5.       Lactate, Ryan     1.1  Comment:  Falsely low lactic acid results can be found in samples   containing >=13.0 mg/dL total bilirubin and/or >=3.5 mg/dL   direct bilirubin.         Lymph #       1.9     Lymph%       15.2     MCH       27.6     MCHC       32.4     MCV       85     Mono #       0.8     Mono%       6.1     MPV       10.3     nRBC       0     Platelets       242     POCT Glucose   96         Potassium       3.5     Total Protein       6.8     Protime     10.2 10.1     RBC       4.90     RDW       13.2     Sodium       131     Sodium Urine Random 54  Comment:  The random urine reference ranges provided were established   for 24 hour urine collections.  No reference ranges exist for  random urine specimens.  Correlate clinically.              TSH     3.440       WBC       12.24         All pertinent labs within the past 24 hours have been reviewed.    Significant Imaging: I have reviewed all pertinent imaging results/findings within the past 24 hours.  I have reviewed and interpreted all pertinent imaging results/findings within the past 24 hours.    Assessment/Plan:     * Back pain    Patient has progressively worsening back pain started 1 week ago that is associated with gait disturbance and numbness of both LEs. Patient has difficulty urination and constipation which could be related to neurological deficit or tramadol use. Patient has incidental finding of right lung mass on imaging,  L3 paraspinal mass and right iliac osseous lesion. Patient has smoking history and positive family history of lung cancer. The mass is concerning for primary lung mass with mets. NSGY was consulted; no intervention.  CXR (02/18/2019): There is abnormal prominent soft tissue opacity involving the right hilum/paramediastinal region concerning for underlying mass. There is patchy bibasilar airspace disease and right pleural effusion with adjacent atelectatic/consolidative changes.    Plan:  - Contact NSGY for the role of steroid due to the prescence of neurological deficits mentioned above. However they stated in their notes that MRI L spine without compressive lesion. They has been contacted and they will call back after discussion.  -  Pulmonology consult for possible bronchoscopy and lung biopsy.  - PRN pain medications and muscle relaxants  - PT/OT       Hyponatremia    Patient is euvolemic in physical exam our differential diagnoses include SIADH due to lung mass. Will send for serum osmolality and urine Na to determine hydration vs fluid restriction. Will monitor his BMP.         VTE Risk Mitigation (From admission, onward)        Ordered     enoxaparin injection 40 mg  Daily      02/18/19 0533     Place sequential compression device  Until discontinued       02/18/19 0533     IP VTE HIGH RISK PATIENT  Once      02/18/19 0533             Keeley Bajwa MD  Department of Hospital Medicine   [facility]

## 2019-02-18 NOTE — ASSESSMENT & PLAN NOTE
Patient has progressively worsening back pain started 1 week ago that is associated with gait disturbance and numbness of both LEs. Patient has difficulty urination and constipation which could be related to neurological deficit or tramadol use. Patient has incidental finding of right lung mass on imaging,  L3 paraspinal mass and right iliac osseous lesion. Patient has smoking history and positive family history of lung cancer. The mass is concerning for primary lung mass with mets. NSGY was consulted; no intervention.  CXR (02/18/2019): There is abnormal prominent soft tissue opacity involving the right hilum/paramediastinal region concerning for underlying mass. There is patchy bibasilar airspace disease and right pleural effusion with adjacent atelectatic/consolidative changes.    Plan:  - Contact NSGY for the role of steroid due to the prescence of neurological deficits mentioned above. However they stated in their notes that MRI L spine without compressive lesion. They has been contacted and they will call back after discussion.  -  Pulmonology consult for possible bronchoscopy and lung biopsy.  - PRN pain medications and muscle relaxants  - PT/OT

## 2019-02-18 NOTE — PLAN OF CARE
Pt PCP is [facility]  No Pharmacies Listed  No future appointments.     02/18/19 1154   Discharge Assessment   Assessment Type Discharge Planning Assessment   Confirmed/corrected address and phone number on facesheet? Yes   Assessment information obtained from? Patient   Expected Length of Stay (days) 3   Communicated expected length of stay with patient/caregiver yes   Prior to hospitilization cognitive status: Alert/Oriented   Prior to hospitalization functional status: Independent   Current cognitive status: Alert/Oriented   Current Functional Status: Independent   Facility Arrived From: [facility]   Lives With spouse   Able to Return to Prior Arrangements yes   Is patient able to care for self after discharge? Yes   Who are your caregiver(s) and their phone number(s)? Betzy Tee  spouse  [phone]   Patient's perception of discharge disposition home or selfcare   Readmission Within the Last 30 Days no previous admission in last 30 days   Patient currently being followed by outpatient case management? No   Patient currently receives any other outside agency services? No   Equipment Currently Used at Home none   Do you have any problems affording any of your prescribed medications? No   Is the patient taking medications as prescribed? yes   Does the patient have transportation home? Yes   Transportation Anticipated family or friend will provide   Discharge Plan A Home with family   Discharge Plan B Home   Patient/Family in Agreement with Plan yes

## 2019-02-18 NOTE — HPI
Patient is a 59 year old male with no past medical history who presents as a transfer.  Patient reports he began having lower back spasms starting 1-2 weeks ago; he had a CT of his spine which revealed an incidental right lung mass. He was given medication for back pain. 6 days ago he was attending his follow-up consultation with a pulmonologist when he noted gait instability and lower extremity numbness. An MRI of the spine was done demonstrating a paraspinal lesion at L3 as well as a pelvic lesion. He states since then he has required more assistance with ambulation and has numbness in both legs. He denies any upper extremity weakness, but does have some patchy nondermatomal numbness. No saddle anesthesia. No bowel or bladder dysfunction.

## 2019-02-18 NOTE — ED NOTES
Pt presents to ED via EMS, tx from [facility] for Neurosurgery consult. Pt states that for about 1 week he has had an unsteady gait, fine tremors in his hands and numbness and tingling to bilateral upper and lower extremities. Pt states that over the week it has become increasingly hard for him to ambulate and if he sits for too long he begins to feel like his quads in both legs are tightening. Pt states that all of these symptoms are acute and that he has no previous PMHx. Pt also endorses not being able to do ADLs that require fine motor skills such as tying his shoe laces or writing his name. Pt also endorses recent severe back pain which prompted him to see his MD. Pt was dx w/ lung cancer 1 week ago and tonight was told that he has a L3 mass. Pt denies blurred/double vision. Pt also denies any respiratory problems such as SOB or wheezing. Pt is AAO x4.

## 2019-02-18 NOTE — PLAN OF CARE
[facility]. I've called multiple #'s w/o any success.    There is a fax # for inpatient medical records. Auth of release of medical records form faxed to , requesting that medical records be faxed back to [facility] station on 8th floor ()    C[place]ELIA w/ Con Martin @ [facility] () ? Verification of correct fax #

## 2019-02-18 NOTE — PLAN OF CARE
Spoke w/ Hannah in Transfer center @ [id number].  Request to start transfer process back to [facility].  Pt transferred here for NSGY eval, no intervention required. Pt would like to resume his care @ [facility].  Hannah given contact info for MD  Ext [phone]

## 2019-02-18 NOTE — SUBJECTIVE & OBJECTIVE
Past Medical History:   Diagnosis Date    Asthma        History reviewed. No pertinent surgical history.    Review of patient's allergies indicates:   Allergen Reactions    Penicillins Rash       Family History     Problem Relation (Age of Onset)    Cancer Father    Heart disease Father        Tobacco Use    Smoking status: Former Smoker     Types: Cigarettes    Smokeless tobacco: Never Used    Tobacco comment: quit 20 yrs ago   Substance and Sexual Activity    Alcohol use: Yes     Comment: socially    Drug use: No    Sexual activity: Not on file         Review of Systems   Constitutional: Negative for chills and fever.   HENT: Negative for congestion, postnasal drip, rhinorrhea and sore throat.    Eyes: Negative for photophobia and visual disturbance.   Respiratory: Negative for cough, chest tightness, shortness of breath, wheezing and stridor.    Cardiovascular: Negative for chest pain, palpitations and leg swelling.   Gastrointestinal: Negative for abdominal distention, abdominal pain, constipation, diarrhea, nausea and vomiting.   Endocrine: Negative for polyuria.   Genitourinary: Negative for decreased urine volume, difficulty urinating, dysuria, flank pain, hematuria and urgency.   Musculoskeletal: Positive for back pain. Negative for myalgias, neck pain and neck stiffness.   Skin: Negative for color change and rash.   Neurological: Positive for weakness and numbness. Negative for dizziness, syncope, light-headedness and headaches.   Hematological: Does not bruise/bleed easily.   Psychiatric/Behavioral: Negative for agitation, confusion and decreased concentration.     Objective:     Vital Signs (Most Recent):  Temp: 98 °F (36.7 °C) (02/18/19 0908)  Pulse: 85 (02/18/19 0908)  Resp: 18 (02/18/19 0908)  BP: (!) 171/96 (02/18/19 0908)  SpO2: 100 % (02/18/19 0908) Vital Signs (24h Range):  Temp:  [98 °F (36.7 °C)-98.7 °F (37.1 °C)] 98 °F (36.7 °C)  Pulse:  [78-86] 85  Resp:  [16-18] 18  SpO2:  [95 %-100 %]  100 %  BP: (119-182)/() 171/96     Weight: 106.6 kg (235 lb)  Body mass index is 31.87 kg/m².    No intake or output data in the 24 hours ending 02/18/19 0920    Physical Exam   Constitutional: He is oriented to person, place, and time. He appears well-developed and well-nourished. No distress.   HENT:   Head: Normocephalic and atraumatic.   Eyes: Conjunctivae are normal. No scleral icterus.   Neck: Normal range of motion. Neck supple. No JVD present.   Cardiovascular: Normal rate, regular rhythm, normal heart sounds and intact distal pulses.   Pulmonary/Chest: Effort normal and breath sounds normal. No respiratory distress. He has no wheezes.   Abdominal: Soft. Bowel sounds are normal.   Musculoskeletal: He exhibits tenderness. He exhibits no edema.   Neurological: He is alert and oriented to person, place, and time. A sensory deficit is present.   Skin: Skin is warm and dry. He is not diaphoretic.   Vitals reviewed.      Vents:       Lines/Drains/Airways     Peripheral Intravenous Line                 Peripheral IV - Single Lumen 02/18/19 0100 Left Antecubital less than 1 day                Significant Labs:    CBC/Anemia Profile:  Recent Labs   Lab 02/18/19  0139 02/18/19  0557   WBC 12.24  --    HGB 13.5*  --    HCT 41.7  --      --    MCV 85  --    RDW 13.2  --    FOLATE  --  11.8   [id number]  --  497        Chemistries:  Recent Labs   Lab 02/18/19  0139   *   K 3.5      CO2 22*   BUN 15   CREATININE 0.8   CALCIUM 8.9   ALBUMIN 3.0*   PROT 6.8   BILITOT 0.7   ALKPHOS 88   ALT 22   AST 24       Recent Lab Results       02/18/19  0650   02/18/19  0602   02/18/19  0557   02/18/19  0139        Immature Granulocytes       0.4     Immature Grans (Abs)       0.05  Comment:  Mild elevation in immature granulocytes is non specific and   can be seen in a variety of conditions including stress response,   acute inflammation, trauma and pregnancy. Correlation with other   laboratory and clinical  findings is essential.       Procalcitonin     0.03  Comment:  A concentration < 0.25 ng/mL represents a low risk bacterial   infection.  Procalcitonin may not be accurate among patients with localized   infection, recent trauma or major surgery, immunosuppressed state,   invasive fungal infection, renal dysfunction. Decisions regarding   initiation or continuation of antibiotic therapy should not be based   solely on procalcitonin levels.         Albumin       3.0     Alkaline Phosphatase       88     ALT       22     Anion Gap       9     aPTT     23.8  Comment:  aPTT therapeutic range = 39-69 seconds 22.9  Comment:  aPTT therapeutic range = 39-69 seconds     AST       24     Baso #       0.07     Basophil%       0.6     Total Bilirubin       0.7  Comment:  For infants and newborns, interpretation of results should be based  on gestational age, weight and in agreement with clinical  observations.  Premature Infant recommended reference ranges:  Up to 24 hours.............<8.0 mg/dL  Up to 48 hours............<12.0 mg/dL  3-5 days..................<15.0 mg/dL  6-29 days.................<15.0 mg/dL       BNP     16  Comment:  Values of less than 100 pg/ml are consistent with non-CHF populations.       BUN, Bld       15     Calcium       8.9     Chloride       100     CO2       22     CPK     305       Creatinine       0.8     Differential Method       Automated     eGFR if        >60.0     eGFR if non        >60.0  Comment:  Calculation used to obtain the estimated glomerular filtration  rate (eGFR) is the CKD-EPI equation.        Eos #       0.1     Eosinophil%       0.5     Estimated Avg Glucose     108       Folate     11.8       Glucose       96     Gran # (ANC)       9.5     Gran%       77.2     Group & Rh       A POS     Hematocrit       41.7     Hemoglobin       13.5     Hemoglobin A1C External     5.4  Comment:  ADA Screening Guidelines:  5.7-6.4%  Consistent with  prediabetes  >or=6.5%  Consistent with diabetes  High levels of fetal hemoglobin interfere with the HbA1C  assay. Heterozygous hemoglobin variants (HbS, HgC, etc)do  not significantly interfere with this assay.   However, presence of multiple variants may affect accuracy.         INDIRECT NIC       NEG     Coumadin Monitoring INR     1.0  Comment:  Coumadin Therapy:  2.0 - 3.0 for INR for all indicators except mechanical heart valves  and antiphospholipid syndromes which should use 2.5 - 3.5.   1.0  Comment:  Coumadin Therapy:  2.0 - 3.0 for INR for all indicators except mechanical heart valves  and antiphospholipid syndromes which should use 2.5 - 3.5.       Lactate, Ryan     1.1  Comment:  Falsely low lactic acid results can be found in samples   containing >=13.0 mg/dL total bilirubin and/or >=3.5 mg/dL   direct bilirubin.         Lymph #       1.9     Lymph%       15.2     MCH       27.6     MCHC       32.4     MCV       85     Mono #       0.8     Mono%       6.1     MPV       10.3     nRBC       0     Osmolality     278       Platelets       242     POCT Glucose   96         Potassium       3.5     Total Protein       6.8     Protime     10.2 10.1     RBC       4.90     RDW       13.2     Sodium       131     Sodium Urine Random 54  Comment:  The random urine reference ranges provided were established   for 24 hour urine collections.  No reference ranges exist for  random urine specimens.  Correlate clinically.             TSH     3.440       Vitamin B-12     497       WBC       12.24         All pertinent labs within the past 24 hours have been reviewed.    Significant Imaging:   I have reviewed all pertinent imaging results/findings within the past 24 hours.

## 2019-02-18 NOTE — HPI
"59M who is a 15 pack-yr ex-smoker (quit 20 yrs ago), otherwise no PMHx, presented to OSH 1wk ago for evaluation of 2wk hx back pain/spasms. A CT-spine and MRI-spine were performed and demonstrated an incidental R eleazar-hilar lung mass with L3 spinal lesion concerning for lung cancer with bony metastasis. He has been transferred from [place], [place] for NSGY evaluation of spinal lesion with new onset progressive lower limb paraesthesia and weakness concerning for spinal cord compression. Pt maintains full control of bowel and bladder, though does endorse numbness of legs b/l, and partial paresthesia extending proximally to L3 with difficulty ambulating. Denies any respiratory symptoms, f/c, night sweats, or weight loss, though had a few day course of "bronchitis" with intermittent cough a couple weeks ago that has since fully resolved. Pt has not yet received biopsy to confirm dx.     Pulmonary consulted for new diagnosis lung cancer  "

## 2019-02-18 NOTE — ED PROVIDER NOTES
Encounter Date: 2/17/2019       History     Chief Complaint   Patient presents with    Fatigue     Patient transferred from [facility] . Pt diagnosed with Lung Ca with mets 1 week ago. Pt also has an L3 mass     Patient is a 59-year-old male with no known past medical history who presents as a transfer from [facility] in [place] for neurosurgical evaluation. Patient reports he began having lower back spasms starting 1-2 weeks ago; he had a CT of his spine which revealed an incidental right lung mass. He was given Tramadol and Flexeril for back pain. He states on Tuesday (6 days ago) he was attending his follow-up consultation with a pulmonologist when he noted gait instability and lower extremity numbness. An MRI of the spine was done demonstrating a lesion at L3. He states since then he has required more assistance with ambulation and has numbness from the lumbar spine to his toes. He denies arm weakness or numbness, bowel/bladder incontinence, back pain, facial droop, speech difficulty, chest pain, nausea, vomiting, or dysuria.       The history is provided by the patient.     Review of patient's allergies indicates:   Allergen Reactions    Penicillins Rash     History reviewed. No pertinent past medical history.  History reviewed. No pertinent surgical history.  History reviewed. No pertinent family history.  Social History     Tobacco Use    Smoking status: Former Smoker     Types: Cigarettes    Tobacco comment: quit 20 yrs ago   Substance Use Topics    Alcohol use: Yes     Comment: socially    Drug use: No     Review of Systems   Constitutional: Negative for chills and fever.   HENT: Negative for sore throat.    Eyes: Negative for visual disturbance.   Respiratory: Negative for cough and shortness of breath.    Cardiovascular: Negative for chest pain.   Gastrointestinal: Negative for abdominal pain, constipation, diarrhea, nausea and vomiting.   Genitourinary: Negative for dysuria.    Musculoskeletal: Positive for gait problem. Negative for back pain, neck pain and neck stiffness.   Skin: Negative for wound.   Neurological: Positive for weakness and numbness. Negative for dizziness, facial asymmetry, speech difficulty, light-headedness and headaches.   Psychiatric/Behavioral: Negative for dysphoric mood.       Physical Exam     Initial Vitals [02/17/19 2301]   BP Pulse Resp Temp SpO2   (!) 168/98 80 -- 98 °F (36.7 °C) 100 %      MAP       --         Physical Exam    Nursing note and vitals reviewed.  Constitutional: He appears well-developed and well-nourished. He is not diaphoretic. No distress.   HENT:   Head: Normocephalic and atraumatic.   Mouth/Throat: Oropharynx is clear and moist. No oropharyngeal exudate.   Eyes: Conjunctivae and EOM are normal. Pupils are equal, round, and reactive to light.   Neck: Normal range of motion. Neck supple.   Cardiovascular: Normal rate, regular rhythm, normal heart sounds and intact distal pulses.   Pulmonary/Chest: Breath sounds normal. No respiratory distress.   Abdominal: Soft. Bowel sounds are normal. There is no tenderness.   Musculoskeletal: Normal range of motion. He exhibits no edema or tenderness.   Neurological: He is alert and oriented to person, place, and time. A sensory deficit is present. No cranial nerve deficit. GCS score is 15. GCS eye subscore is 4. GCS verbal subscore is 5. GCS motor subscore is 6.   Hypoesthesia noted from the level of the lumbar spine to bilateral legs.   No saddle anesthesia.  Normal finger-to-nose, unable to perform heel-to-shin 2/2 weakness.   BLE pronator drift.   5/5 dorsi/plantarflexion, knee flexion bilaterally.     Skin: Skin is warm and dry.   Psychiatric: He has a normal mood and affect. Thought content normal.         ED Course   Procedures  Labs Reviewed   COMPREHENSIVE METABOLIC PANEL - Abnormal; Notable for the following components:       Result Value    Sodium 131 (*)     CO2 22 (*)     Albumin 3.0 (*)      All other components within normal limits   CBC W/ AUTO DIFFERENTIAL - Abnormal; Notable for the following components:    Hemoglobin 13.5 (*)     Gran # (ANC) 9.5 (*)     Immature Grans (Abs) 0.05 (*)     Gran% 77.2 (*)     Lymph% 15.2 (*)     All other components within normal limits   APTT   PROTIME-INR   HEMOGLOBIN A1C   TSH   PROCALCITONIN   LACTIC ACID, PLASMA   CK   B-TYPE NATRIURETIC PEPTIDE   PROTIME-INR   APTT   POCT GLUCOSE, HAND-HELD DEVICE   TYPE & SCREEN   POCT GLUCOSE          Imaging Results          MRI Cervical Spine W WO Cont (Final result)  Result time 02/18/19 04:38:20    Final result by Madhavi Doe MD (02/18/19 04:38:20)                 Impression:      1. Mild degenerative change of the cervical spine as detailed above.  2. 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      Electronically signed by: Madhavi Doe MD  Date:    02/18/2019  Time:    04:38             Narrative:    EXAMINATION:  MRI CERVICAL SPINE W WO CONTRAST    CLINICAL HISTORY:  weakness;    TECHNIQUE:  Multiplanar, multisequence MR images of the cervical spine are obtained before and after the administration of 10 cc gadolinium based IV contrast.    COMPARISON:  None    FINDINGS:  Examination is slightly limited by patient motion artifact.  Sagittal cervical vertebral body alignment is within normal limits.  Vertebral body heights are maintained without evidence of acute fracture or infiltrating marrow replacement process.  Intervertebral disc heights are relatively maintained.    Incidentally noted prominent retrocerebellar CSF space is identified on sagittal images possibly relating to honorio cisterna magna versus arachnoid cyst.  Craniocervical junction is otherwise within normal limits.  The cervical spinal cord is normal in caliber and signal intensity.  Following the administration of IV contrast, no pathologic foci of enhancement are  identified.    C2-C3: No significant spinal canal or neural foraminal narrowing.    C3-C4: Left uncovertebral joint DJD and facet arthropathy result in moderate left neural foraminal narrowing.  No significant spinal canal or right neural foraminal narrowing.    C4-C5: There is a small posterior disc osteophyte complex and uncovertebral joint DJD.  There is effacement of the anterior thecal sac and mild left neural foraminal narrowing.    C5-C6: There is a mild posterior disc osteophyte complex and uncovertebral joint DJD.  There is slight effacement of the anterior thecal sac and mild left neural foraminal narrowing.    C6-C7: No significant spinal canal or neural foraminal narrowing.    C6-C7: No significant spinal canal or neural foraminal narrowing.                                 Medical Decision Making:   Initial Assessment:   59WM with no known PMHx who presents to the ED as a transfer from [facility] for neurosurgical evaluation following recent lung mass and spinal lesion discovery. PE reveals a non-toxic, afebrile, well-appearing male in no acute distress. Vital signs are stable. PE remarkable for numbness from the L-spine to toes with LE weakness and saddle anesthesia.   Differential Diagnosis:   DDx includes but is not limited to: metastatic disease progression, cauda equina syndrome, epidural abscess.  Clinical Tests:   Lab Tests: Ordered and Reviewed  Radiological Study: Reviewed and Ordered  ED Management:  Printed records obtained from [facility]. Will consult neurosurgery for their recs.    NSG recommending labs and MRI C-spine.     CBC unremarkable; CMP remarkable for mild hyponatremia at 131, bicarb 22, albumin 3.0.  APTT and PT INR within normal limits.  MRI C-spine remarkable for mild degenerative changes an incidental prominent retrocerebellar CSF space possibly relating to honorio cisterna magna or arachnoid cyst.    NSG reviewed imaging; MRI L-spine with L3 paraspinal mass without  encroachment on the spinal canal.  No neurosurgical intervention necessary.  Recommend Hospital Medicine admission for full oncological workup.  Patient informed of plan and is agreeable. I have discussed patient case with my supervisory physician, who is in agreement with my assessment and plan.      Other:   I have discussed this case with another health care provider.              Attending Attestation:     Physician Attestation Statement for NP/PA:   I discussed this assessment and plan of this patient with the NP/PA, but I did not personally examine the patient. The face to face encounter was performed by the NP/PA.                     Clinical Impression:   The primary encounter diagnosis was Malignancy. A diagnosis of Lung mass was also pertinent to this visit.      Disposition:   Disposition: Admitted  Condition: Stable                        Shelley Steven PA-C  02/18/19 0515       Tj Hoffman MD  02/18/19 0604

## 2019-02-18 NOTE — CONSULTS
[facility]  Neurosurgery  Consult Note    Consults  Subjective:     Chief Complaint/Reason for Admission: Weakness    History of Present Illness: Patient is a 59 year old male with no past medical history who presents as a transfer.  Patient reports he began having lower back spasms starting 1-2 weeks ago; he had a CT of his spine which revealed an incidental right lung mass. He was given medication for back pain. 6 days ago he was attending his follow-up consultation with a pulmonologist when he noted gait instability and lower extremity numbness. An MRI of the spine was done demonstrating a paraspinal lesion at L3 as well as a pelvic lesion. He states since then he has required more assistance with ambulation and has numbness in both legs. He denies any upper extremity weakness, but does have some patchy nondermatomal numbness. No saddle anesthesia. No bowel or bladder dysfunction.         (Not in a hospital admission)    Review of patient's allergies indicates:   Allergen Reactions    Penicillins Rash       History reviewed. No pertinent past medical history.  History reviewed. No pertinent surgical history.  Family History     None        Tobacco Use    Smoking status: Former Smoker     Types: Cigarettes    Tobacco comment: quit 20 yrs ago   Substance and Sexual Activity    Alcohol use: Yes     Comment: socially    Drug use: No    Sexual activity: Not on file     Review of Systems   Constitutional: Negative for chills and fatigue.   Eyes: Negative for photophobia and visual disturbance.   Respiratory: Negative for chest tightness and shortness of breath.    Cardiovascular: Negative for chest pain and palpitations.   Gastrointestinal: Negative for nausea and vomiting.   Musculoskeletal: Positive for back pain. Negative for neck pain.   Neurological: Positive for weakness and numbness. Negative for headaches.   Psychiatric/Behavioral: Negative for confusion.     Objective:        There is  no height or weight on file to calculate BMI.  Vital Signs (Most Recent):  Temp: 98 °F (36.7 °C) (02/17/19 2301)  Pulse: 78 (02/18/19 0141)  BP: (!) 119/90 (02/17/19 2337)  SpO2: 95 % (02/18/19 0141) Vital Signs (24h Range):  Temp:  [98 °F (36.7 °C)-98.7 °F (37.1 °C)] 98 °F (36.7 °C)  Pulse:  [78-86] 78  Resp:  [16] 16  SpO2:  [95 %-100 %] 95 %  BP: (119-168)/(90-98) 119/90     Neurosurgery Physical Exam    General: well developed, well nourished, no distress.   Head: normocephalic, atraumatic  Neurologic: Alert and oriented. Thought content appropriate.  GCS: Motor: 6/Verbal: 5/Eyes: 4 GCS Total: 15  Mental Status: Awake, Alert, Oriented x 4  Language: No aphasia  Speech: No dysarthria  Cranial nerves: face symmetric, tongue midline, CN II-XII grossly intact.   Eyes: pupils equal, round, reactive to light with accomodation, EOMI.  Pulmonary: normal respirations, no signs of respiratory distress  Abdomen: soft, non-distended  Sensory: intact to light touch throughout  Motor Strength: Moves all extremities spontaneously with good tone. No abnormal movements seen.     Strength  Deltoids Triceps Biceps Wrist Extension Wrist Flexion Hand    Upper: R 5/5 5/5 5/5 5/5 5/5 5/5    L 5/5 5/5 5/5 5/5 5/5 5/5     Iliopsoas Quadriceps Knee  Flexion Tibialis  anterior Gastro- cnemius EHL   Lower: R 5/5 5/5 5/5 5/5 5/5 4+/5    L 5/5 5/5 5/5 5/5 5/5 5/5     DTR's - 2 + and symmetric in UE and LE  Pronator Drift: no drift noted  Finger-to-nose: Intact bilaterally  Beal: absent  Clonus: absent  Pulses: 2+ and symmetric radial and dorsalis pedis.      Significant Labs:  No results for input(s): GLU, NA, K, CL, CO2, BUN, CREATININE, CALCIUM, MG in the last 48 hours.  No results for input(s): WBC, HGB, HCT, PLT in the last 48 hours.  No results for input(s): LABPT, INR, APTT in the last 48 hours.  Microbiology Results (last 7 days)     ** No results found for the last 168 hours. **        All pertinent labs from the last 24 hours  have been reviewed.    Significant Diagnostics:  I have reviewed all pertinent imaging results/findings within the past 24 hours.    Assessment/Plan:     * Back pain    59M with new lung mass complaining of weakness and numbness in the BLE with MRI demonstrating L3 paraspinal mass. No encroachment on the spinal canal.    -- No acute neurosurgical intervention necessary.  -- Admit to  for full oncological workup, given pulmonary mass.  -- MRI L spine without compressive lesion.  -- Further recs to follow review of imaging with staff.             Thank you for your consult. I will follow-up with patient. Please contact us if you have any additional questions.    Aditya Campo MD  Neurosurgery  [facility]

## 2019-02-18 NOTE — ASSESSMENT & PLAN NOTE
Patient is euvolemic in physical exam our differential diagnoses include SIADH due to lung mass. Will send for serum osmolality and urine Na to determine hydration vs fluid restriction. Will monitor his BMP.

## 2019-02-18 NOTE — HPI
59-year-old male with no known past medical history who presents as a transfer from [facility] in [place] for neurosurgical evaluation. Patient reports he began having lower back spasms starting 1-2 weeks ago; he had a CT of his spine which revealed an incidental right lung mass. He was given Tramadol and Flexeril for back pain. He states on Tuesday (6 days ago) he was attending his follow-up consultation with a pulmonologist when he noted gait instability and lower extremity numbness. An MRI of the spine was done demonstrating a lesion at L3. He states since then he has required more assistance with ambulation and has numbness from the lumbar spine to his toes. Now he can not stand without holding onto something. He denies arm weakness or numbness, bowel/bladder incontinence, back pain, facial droop, speech difficulty, chest pain, nausea, vomiting, or dysuria.    Smoking History:  Patient quitted smoking for 20 years. He has been a smoker for 20 years    Family History:  His maternal grandfather  of lung cancer.

## 2019-02-18 NOTE — SUBJECTIVE & OBJECTIVE
(Not in a hospital admission)    Review of patient's allergies indicates:   Allergen Reactions    Penicillins Rash       History reviewed. No pertinent past medical history.  History reviewed. No pertinent surgical history.  Family History     None        Tobacco Use    Smoking status: Former Smoker     Types: Cigarettes    Tobacco comment: quit 20 yrs ago   Substance and Sexual Activity    Alcohol use: Yes     Comment: socially    Drug use: No    Sexual activity: Not on file     Review of Systems   Constitutional: Negative for chills and fatigue.   Eyes: Negative for photophobia and visual disturbance.   Respiratory: Negative for chest tightness and shortness of breath.    Cardiovascular: Negative for chest pain and palpitations.   Gastrointestinal: Negative for nausea and vomiting.   Musculoskeletal: Positive for back pain. Negative for neck pain.   Neurological: Positive for weakness and numbness. Negative for headaches.   Psychiatric/Behavioral: Negative for confusion.     Objective:        There is no height or weight on file to calculate BMI.  Vital Signs (Most Recent):  Temp: 98 °F (36.7 °C) (02/17/19 2301)  Pulse: 78 (02/18/19 0141)  BP: (!) 119/90 (02/17/19 2337)  SpO2: 95 % (02/18/19 0141) Vital Signs (24h Range):  Temp:  [98 °F (36.7 °C)-98.7 °F (37.1 °C)] 98 °F (36.7 °C)  Pulse:  [78-86] 78  Resp:  [16] 16  SpO2:  [95 %-100 %] 95 %  BP: (119-168)/(90-98) 119/90     Neurosurgery Physical Exam    General: well developed, well nourished, no distress.   Head: normocephalic, atraumatic  Neurologic: Alert and oriented. Thought content appropriate.  GCS: Motor: 6/Verbal: 5/Eyes: 4 GCS Total: 15  Mental Status: Awake, Alert, Oriented x 4  Language: No aphasia  Speech: No dysarthria  Cranial nerves: face symmetric, tongue midline, CN II-XII grossly intact.   Eyes: pupils equal, round, reactive to light with accomodation, EOMI.  Pulmonary: normal respirations, no signs of respiratory distress  Abdomen:  soft, non-distended  Sensory: intact to light touch throughout  Motor Strength: Moves all extremities spontaneously with good tone. No abnormal movements seen.     Strength  Deltoids Triceps Biceps Wrist Extension Wrist Flexion Hand    Upper: R 5/5 5/5 5/5 5/5 5/5 5/5    L 5/5 5/5 5/5 5/5 5/5 5/5     Iliopsoas Quadriceps Knee  Flexion Tibialis  anterior Gastro- cnemius EHL   Lower: R 5/5 5/5 5/5 5/5 5/5 4+/5    L 5/5 5/5 5/5 5/5 5/5 5/5     DTR's - 2 + and symmetric in UE and LE  Pronator Drift: no drift noted  Finger-to-nose: Intact bilaterally  Beal: absent  Clonus: absent  Pulses: 2+ and symmetric radial and dorsalis pedis.      Significant Labs:  No results for input(s): GLU, NA, K, CL, CO2, BUN, CREATININE, CALCIUM, MG in the last 48 hours.  No results for input(s): WBC, HGB, HCT, PLT in the last 48 hours.  No results for input(s): LABPT, INR, APTT in the last 48 hours.  Microbiology Results (last 7 days)     ** No results found for the last 168 hours. **        All pertinent labs from the last 24 hours have been reviewed.    Significant Diagnostics:  I have reviewed all pertinent imaging results/findings within the past 24 hours.

## 2019-02-18 NOTE — ASSESSMENT & PLAN NOTE
59M with new lung mass complaining of weakness and numbness in the BLE with MRI demonstrating L3 paraspinal mass. No encroachment on the spinal canal.    -- No acute neurosurgical intervention necessary.  -- Admit to  for full oncological workup, given pulmonary mass.  -- MRI L spine without compressive lesion.  -- Further recs to follow review of imaging with staff.

## 2019-02-18 NOTE — SUBJECTIVE & OBJECTIVE
History reviewed. No pertinent past medical history.    History reviewed. No pertinent surgical history.    Review of patient's allergies indicates:   Allergen Reactions    Penicillins Rash       No current facility-administered medications on file prior to encounter.      Current Outpatient Medications on File Prior to Encounter   Medication Sig    cyclobenzaprine (FLEXERIL) 5 MG tablet Take 5 mg by mouth 3 (three) times daily as needed for Muscle spasms.    tramadol (ULTRAM) 25 mg tablet Take by mouth every 6 (six) hours as needed for Pain.    zolpidem (AMBIEN) 5 MG Tab Take 5 mg by mouth nightly as needed.     Family History     None        Tobacco Use    Smoking status: Former Smoker     Types: Cigarettes    Tobacco comment: quit 20 yrs ago   Substance and Sexual Activity    Alcohol use: Yes     Comment: socially    Drug use: No    Sexual activity: Not on file     Review of Systems   Constitutional: Positive for activity change and fatigue. Negative for appetite change, chills, diaphoresis, fever and unexpected weight change.   HENT: Negative for sore throat and trouble swallowing.    Eyes: Negative for photophobia, pain, discharge, redness, itching and visual disturbance.   Respiratory: Negative for cough, choking, chest tightness, shortness of breath, wheezing and stridor.    Cardiovascular: Negative for chest pain and leg swelling.   Gastrointestinal: Positive for constipation. Negative for abdominal pain, diarrhea, nausea and vomiting.   Genitourinary: Negative for dysuria and hematuria.        Has the urge to pass urine. Takes longer than usual to pass urine   Neurological: Positive for dizziness, weakness, light-headedness and numbness. Negative for seizures, syncope, speech difficulty and headaches.   Psychiatric/Behavioral: Negative for confusion.     Objective:     Vital Signs (Most Recent):  Temp: 98 °F (36.7 °C) (02/17/19 2301)  Pulse: 78 (02/18/19 0141)  BP: (!) 119/90 (02/17/19 2337)  SpO2:  95 % (02/18/19 0141) Vital Signs (24h Range):  Temp:  [98 °F (36.7 °C)-98.7 °F (37.1 °C)] 98 °F (36.7 °C)  Pulse:  [78-86] 78  Resp:  [16] 16  SpO2:  [95 %-100 %] 95 %  BP: (119-168)/(90-98) 119/90        There is no height or weight on file to calculate BMI.    Physical Exam   Constitutional: He is oriented to person, place, and time. He appears well-developed and well-nourished. No distress.   HENT:   Head: Normocephalic and atraumatic.   Eyes: Right eye exhibits no discharge. Left eye exhibits no discharge.   Neck: Neck supple. No JVD present.   Cardiovascular: Normal rate and regular rhythm. Exam reveals no gallop and no friction rub.   No murmur heard.  Pulmonary/Chest: Effort normal and breath sounds normal. No stridor. No respiratory distress. He has no wheezes. He has no rales. He exhibits no tenderness.   Abdominal: Soft. Bowel sounds are normal. There is no tenderness.   Musculoskeletal: He exhibits no edema, tenderness or deformity.   Neurological: He is alert and oriented to person, place, and time.   Hypoesthesia noted from the level of the lumbar spine to bilateral legs.   No saddle anesthesia.  Normal finger-to-nose, unable to perform heel-to-shin 2/2 weakness.   BLE pronator drift.   5/5 dorsi/plantarflexion, knee flexion bilaterally.   Skin: He is not diaphoretic.   Psychiatric: He has a normal mood and affect. His behavior is normal.           Significant Labs:   A1C:   Recent Labs   Lab 02/18/19  0557   HGBA1C 5.4     Bilirubin:   Recent Labs   Lab 02/18/19  0139   BILITOT 0.7     BMP:   Recent Labs   Lab 02/18/19 0139   GLU 96   *   K 3.5      CO2 22*   BUN 15   CREATININE 0.8   CALCIUM 8.9     CBC:   Recent Labs   Lab 02/18/19 0139   WBC 12.24   HGB 13.5*   HCT 41.7        CMP:   Recent Labs   Lab 02/18/19 0139   *   K 3.5      CO2 22*   GLU 96   BUN 15   CREATININE 0.8   CALCIUM 8.9   PROT 6.8   ALBUMIN 3.0*   BILITOT 0.7   ALKPHOS 88   AST 24   ALT 22    ANIONGAP 9   EGFRNONAA >60.0     Cardiac Markers:   Recent Labs   Lab 02/18/19  0557   BNP 16     Coagulation:   Recent Labs   Lab 02/18/19 0557   INR 1.0   APTT 23.8     Lactic Acid:   Recent Labs   Lab 02/18/19  0557   LACTATE 1.1       Pathology Results  (Last 10 years)    None        POCT Glucose:   Recent Labs   Lab 02/18/19  0602   POCTGLUCOSE 96     TSH:   Recent Labs   Lab 02/18/19  0557   TSH 3.440       Recent Lab Results       02/18/19  0650   02/18/19  0602   02/18/19  0557   02/18/19  0139        Immature Granulocytes       0.4     Immature Grans (Abs)       0.05  Comment:  Mild elevation in immature granulocytes is non specific and   can be seen in a variety of conditions including stress response,   acute inflammation, trauma and pregnancy. Correlation with other   laboratory and clinical findings is essential.       Procalcitonin     0.03  Comment:  A concentration < 0.25 ng/mL represents a low risk bacterial   infection.  Procalcitonin may not be accurate among patients with localized   infection, recent trauma or major surgery, immunosuppressed state,   invasive fungal infection, renal dysfunction. Decisions regarding   initiation or continuation of antibiotic therapy should not be based   solely on procalcitonin levels.         Albumin       3.0     Alkaline Phosphatase       88     ALT       22     Anion Gap       9     aPTT     23.8  Comment:  aPTT therapeutic range = 39-69 seconds 22.9  Comment:  aPTT therapeutic range = 39-69 seconds     AST       24     Baso #       0.07     Basophil%       0.6     Total Bilirubin       0.7  Comment:  For infants and newborns, interpretation of results should be based  on gestational age, weight and in agreement with clinical  observations.  Premature Infant recommended reference ranges:  Up to 24 hours.............<8.0 mg/dL  Up to 48 hours............<12.0 mg/dL  3-5 days..................<15.0 mg/dL  6-29 days.................<15.0 mg/dL       BNP      16  Comment:  Values of less than 100 pg/ml are consistent with non-CHF populations.       BUN, Bld       15     Calcium       8.9     Chloride       100     CO2       22     CPK     305       Creatinine       0.8     Differential Method       Automated     eGFR if        >60.0     eGFR if non        >60.0  Comment:  Calculation used to obtain the estimated glomerular filtration  rate (eGFR) is the CKD-EPI equation.        Eos #       0.1     Eosinophil%       0.5     Estimated Avg Glucose     108       Glucose       96     Gran # (ANC)       9.5     Gran%       77.2     Group & Rh       A POS     Hematocrit       41.7     Hemoglobin       13.5     Hemoglobin A1C External     5.4  Comment:  ADA Screening Guidelines:  5.7-6.4%  Consistent with prediabetes  >or=6.5%  Consistent with diabetes  High levels of fetal hemoglobin interfere with the HbA1C  assay. Heterozygous hemoglobin variants (HbS, HgC, etc)do  not significantly interfere with this assay.   However, presence of multiple variants may affect accuracy.         INDIRECT NIC       NEG     Coumadin Monitoring INR     1.0  Comment:  Coumadin Therapy:  2.0 - 3.0 for INR for all indicators except mechanical heart valves  and antiphospholipid syndromes which should use 2.5 - 3.5.   1.0  Comment:  Coumadin Therapy:  2.0 - 3.0 for INR for all indicators except mechanical heart valves  and antiphospholipid syndromes which should use 2.5 - 3.5.       Lactate, Ryan     1.1  Comment:  Falsely low lactic acid results can be found in samples   containing >=13.0 mg/dL total bilirubin and/or >=3.5 mg/dL   direct bilirubin.         Lymph #       1.9     Lymph%       15.2     MCH       27.6     MCHC       32.4     MCV       85     Mono #       0.8     Mono%       6.1     MPV       10.3     nRBC       0     Platelets       242     POCT Glucose   96         Potassium       3.5     Total Protein       6.8     Protime     10.2 10.1     RBC        4.90     RDW       13.2     Sodium       131     Sodium Urine Random 54  Comment:  The random urine reference ranges provided were established   for 24 hour urine collections.  No reference ranges exist for  random urine specimens.  Correlate clinically.             TSH     3.440       WBC       12.24         All pertinent labs within the past 24 hours have been reviewed.    Significant Imaging: I have reviewed all pertinent imaging results/findings within the past 24 hours.  I have reviewed and interpreted all pertinent imaging results/findings within the past 24 hours.

## 2019-02-18 NOTE — PLAN OF CARE
Problem: Physical Therapy Goal  Goal: Physical Therapy Goal  Goals to be met by: 10 days ()    Patient will increase functional independence with mobility by performin. Supine to sit with Modified [place]  2. Sit to supine with Modified [place]  3. Sit to stand transfer with Supervision  4. Gait  x 200 feet with Supervision using Rolling Walker.   5. Lower extremity exercise program x30 reps per handout, with independence to improve muscular strength and endurance.     Outcome: Ongoing (interventions implemented as appropriate)  PT evaluation completed. POC initiated.    Melissa Reynaga, PT  2019

## 2019-02-18 NOTE — MEDICAL/APP STUDENT
[facility]  Pulmonology  Consult Note    Patient Name: Gurinder Tee  MRN: [id number]  Admission Date: 2/17/2019  Hospital Length of Stay: 0 days  Code Status: No Order  Attending Physician: Quinton Louie MD  Primary Care Provider: Primary Doctor No   Principal Problem: Back pain    HPI:     59-year-old male with PMHx of childhood asthma, and arthritis (never received treatment) who presents as a transfer from [facility] in [place] for neurosurgical evaluation.     Patient reports he began having sharp pain and spasms in his lower back starting 1-2 weeks ago; The pain was sudden onset and has been progressively getting worse. Has since had gait instability and lower extremity numbness. An MRI of the spine was done demonstrating   - a paraspinal lesion at L3,   - L4-5 disk bulge with moderate L>R neuroforaminal narrowing  - 6mm enhancing osseous lesion in right iliac crest.     He had a CT of his spine a week ago which revealed an incidental right lung mass which is likely malignant. He has been following-up with a pulmonologist at [facility] for the lung mass, and is scheduled to have a lung bipopsy at [facility] on this Wednesday.     There are reports from the outside hospital that indicate he has had a CT chest abdo pelvis, but the images and reports are not available, and the patient doesn't remember that scan being done. So the only imaging of the chest that is available is the CXR performed today, which shows right hilar opacity.       On review of systems, the patient reports recent urinary hesitancy and longer term nocturia (occurring 2-3 times a night for the last 3 years). Also Pt has had weight loss, though he reports this as being intentional.     Smoking History:  Patient 15 pack-year history, quit smoking 21 years ago.    Occupational Hx:  Worked in [employer] for 30 years, he was never told he had exposure to asbestos or other toxic substances, but he says it is possible.    Family  History:  His maternal grandfather  of lung cancer.  Father had stomach cancer              Past Medical History:   Diagnosis Date    Asthma        History reviewed. No pertinent surgical history.    Review of patient's allergies indicates:   Allergen Reactions    Penicillins Rash     Review of Systems   Constitutional: Negative for chills, fatigue and fever.   HENT: Negative for congestion, rhinorrhea and sore throat.    Respiratory: Negative for shortness of breath and wheezing.    Cardiovascular: Negative for chest pain and leg swelling.   Gastrointestinal: Negative for constipation, diarrhea and nausea.   Genitourinary: Positive for difficulty urinating (hesitency and nocturia).   Musculoskeletal: Positive for back pain.   Neurological: Positive for weakness and numbness.     Objective:     Vital Signs (Most Recent):  Temp: 97.8 °F (36.6 °C) (19)  Pulse: 83 (19)  Resp: 18 (19)  BP: (!) 177/112(Int Med Team 3 provider notified) (19)  SpO2: 99 % (19) Vital Signs (24h Range):  Temp:  [97.8 °F (36.6 °C)-98.7 °F (37.1 °C)] 97.8 °F (36.6 °C)  Pulse:  [78-86] 83  Resp:  [16-18] 18  SpO2:  [95 %-100 %] 99 %  BP: (119-182)/() 177/112     Weight: 106.6 kg (235 lb)  Body mass index is 31.87 kg/m².      Intake/Output Summary (Last 24 hours) at 2019 1229  Last data filed at 2019 0908  Gross per 24 hour   Intake 0 ml   Output --   Net 0 ml       Physical Exam   Constitutional: He appears well-developed.   Neck: No JVD present.   Cardiovascular: Normal rate, regular rhythm and normal heart sounds.   No murmur heard.  Pulmonary/Chest: Stridor (occasionaly present) present. No accessory muscle usage. No tachypnea. No respiratory distress. He has no decreased breath sounds. He has no wheezes. He has no rhonchi. He has no rales.   Abdominal: Soft. He exhibits no distension. There is no tenderness.   Musculoskeletal: He exhibits no edema.          Significant Labs:    CBC/Anemia Profile:  Recent Labs   Lab 02/18/19  0139 02/18/19  0557   WBC 12.24  --    HGB 13.5*  --    HCT 41.7  --      --    MCV 85  --    RDW 13.2  --    FOLATE  --  11.8   [id number]  --  497        Chemistries:  Recent Labs   Lab 02/18/19  0139 02/18/19  0855   * 131*   K 3.5 3.8    100   CO2 22* 22*   BUN 15 14   CREATININE 0.8 0.8   CALCIUM 8.9 8.9   ALBUMIN 3.0*  --    PROT 6.8  --    BILITOT 0.7  --    ALKPHOS 88  --    ALT 22  --    AST 24  --         2/18/2019 06:50   Sodium Urine Random 54            Assessment/Plan:     Active Diagnoses:    Diagnosis Date Noted POA    PRINCIPAL PROBLEM:  Back pain [M54.9] 02/18/2019 Yes    Mass of right lung [R91.8] 02/18/2019 Yes    Hyponatremia [E87.1] 02/18/2019 Unknown          ***        Caitlin Zhang  MS4

## 2019-02-19 NOTE — ASSESSMENT & PLAN NOTE
Patient is euvolemic in physical exam our differential diagnoses include SIADH due to lung mass. Will send for serum osmolality and urine Na to determine hydration vs fluid restriction. Will monitor his BMP.    - will continue care in [place] with lung biopsy for further evaluation of lung mass

## 2019-02-19 NOTE — ASSESSMENT & PLAN NOTE
Patient has progressively worsening back pain started 1 week ago that is associated with gait disturbance and numbness of both LEs. Patient has difficulty urination and constipation which could be related to neurological deficit or tramadol use. MRI obtained and NRSGY consulted. [place] their is no role for this patient as spinal cord compression was not present.     Plan:  - Transfer back to original hospital   - PRN pain medications and muscle relaxants  - PT/OT

## 2019-02-19 NOTE — SUBJECTIVE & OBJECTIVE
Review of Systems   Constitutional: Positive for activity change and fatigue. Negative for appetite change, chills, diaphoresis, fever and unexpected weight change.   HENT: Negative for sore throat and trouble swallowing.    Eyes: Negative for photophobia, pain, discharge, redness, itching and visual disturbance.   Respiratory: Negative for cough, choking, chest tightness, shortness of breath, wheezing and stridor.    Cardiovascular: Negative for chest pain and leg swelling.   Gastrointestinal: Positive for constipation. Negative for abdominal pain, diarrhea, nausea and vomiting.   Genitourinary: Negative for dysuria and hematuria.        Has the urge to pass urine. Takes longer than usual to pass urine   Neurological: Positive for dizziness, weakness, light-headedness and numbness. Negative for seizures, syncope, speech difficulty and headaches.   Psychiatric/Behavioral: Negative for confusion.     Objective:     Vital Signs (Most Recent):  Temp: 98.6 °F (37 °C) (02/18/19 1730)  Pulse: 90 (02/18/19 1730)  Resp: 18 (02/18/19 1730)  BP: (!) 192/122(provider with Internal Medicine Team 3 paged) (02/18/19 1730)  SpO2: 99 % (02/18/19 1730) Vital Signs (24h Range):  Temp:  [97.8 °F (36.6 °C)-98.6 °F (37 °C)] 98.6 °F (37 °C)  Pulse:  [78-90] 90  Resp:  [18] 18  SpO2:  [95 %-100 %] 99 %  BP: (119-192)/() 192/122     Weight: 106.6 kg (235 lb)  Body mass index is 31.87 kg/m².    Intake/Output Summary (Last 24 hours) at 2/18/2019 2054  Last data filed at 2/18/2019 1700  Gross per 24 hour   Intake 480 ml   Output 375 ml   Net 105 ml      Physical Exam   Constitutional: He is oriented to person, place, and time. He appears well-developed and well-nourished. No distress.   HENT:   Head: Normocephalic and atraumatic.   Eyes: Right eye exhibits no discharge. Left eye exhibits no discharge.   Neck: Neck supple. No JVD present.   Cardiovascular: Normal rate and regular rhythm. Exam reveals no gallop and no friction rub.   No  murmur heard.  Pulmonary/Chest: Effort normal and breath sounds normal. No stridor. No respiratory distress. He has no wheezes. He has no rales. He exhibits no tenderness.   Abdominal: Soft. Bowel sounds are normal. There is no tenderness.   Musculoskeletal: He exhibits no edema, tenderness or deformity.   Neurological: He is alert and oriented to person, place, and time.   Hypoesthesia noted from the level of the lumbar spine to bilateral legs.   No saddle anesthesia.  Normal finger-to-nose, unable to perform heel-to-shin 2/2 weakness.   BLE pronator drift.   5/5 dorsi/plantarflexion, knee flexion bilaterally.   Skin: He is not diaphoretic.   Psychiatric: He has a normal mood and affect. His behavior is normal.       Significant Labs:   Recent Results (from the past 48 hour(s))   APTT    Collection Time: 02/18/19  1:39 AM   Result Value Ref Range    aPTT 22.9 21.0 - 32.0 sec   Protime-INR    Collection Time: 02/18/19  1:39 AM   Result Value Ref Range    Prothrombin Time 10.1 9.0 - 12.5 sec    INR 1.0 0.8 - 1.2   Type & Screen    Collection Time: 02/18/19  1:39 AM   Result Value Ref Range    Group & Rh A POS     Indirect Sary NEG    Comprehensive metabolic panel    Collection Time: 02/18/19  1:39 AM   Result Value Ref Range    Sodium 131 (L) 136 - 145 mmol/L    Potassium 3.5 3.5 - 5.1 mmol/L    Chloride 100 95 - 110 mmol/L    CO2 22 (L) 23 - 29 mmol/L    Glucose 96 70 - 110 mg/dL    BUN, Bld 15 8 - 23 mg/dL    Creatinine 0.8 0.5 - 1.4 mg/dL    Calcium 8.9 8.7 - 10.5 mg/dL    Total Protein 6.8 6.0 - 8.4 g/dL    Albumin 3.0 (L) 3.5 - 5.2 g/dL    Total Bilirubin 0.7 0.1 - 1.0 mg/dL    Alkaline Phosphatase 88 55 - 135 U/L    AST 24 10 - 40 U/L    ALT 22 10 - 44 U/L    Anion Gap 9 8 - 16 mmol/L    eGFR if African American >60.0 >60 mL/min/1.73 m^2    eGFR if non African American >60.0 >60 mL/min/1.73 m^2   CBC auto differential    Collection Time: 02/18/19  1:39 AM   Result Value Ref Range    WBC 12.24 3.90 - 12.70  K/uL    RBC 4.90 4.60 - 6.20 M/uL    Hemoglobin 13.5 (L) 14.0 - 18.0 g/dL    Hematocrit 41.7 40.0 - 54.0 %    MCV 85 82 - 98 fL    MCH 27.6 27.0 - 31.0 pg    MCHC 32.4 32.0 - 36.0 g/dL    RDW 13.2 11.5 - 14.5 %    Platelets 242 150 - 350 K/uL    MPV 10.3 9.2 - 12.9 fL    Immature Granulocytes 0.4 0.0 - 0.5 %    Gran # (ANC) 9.5 (H) 1.8 - 7.7 K/uL    Immature Grans (Abs) 0.05 (H) 0.00 - 0.04 K/uL    Lymph # 1.9 1.0 - 4.8 K/uL    Mono # 0.8 0.3 - 1.0 K/uL    Eos # 0.1 0.0 - 0.5 K/uL    Baso # 0.07 0.00 - 0.20 K/uL    nRBC 0 0 /100 WBC    Gran% 77.2 (H) 38.0 - 73.0 %    Lymph% 15.2 (L) 18.0 - 48.0 %    Mono% 6.1 4.0 - 15.0 %    Eosinophil% 0.5 0.0 - 8.0 %    Basophil% 0.6 0.0 - 1.9 %    Differential Method Automated    Hemoglobin A1c    Collection Time: 02/18/19  5:57 AM   Result Value Ref Range    Hemoglobin A1C 5.4 4.0 - 5.6 %    Estimated Avg Glucose 108 68 - 131 mg/dL   TSH    Collection Time: 02/18/19  5:57 AM   Result Value Ref Range    TSH 3.440 0.400 - 4.000 uIU/mL   Procalcitonin    Collection Time: 02/18/19  5:57 AM   Result Value Ref Range    Procalcitonin 0.03 <0.25 ng/mL   Lactic acid, plasma    Collection Time: 02/18/19  5:57 AM   Result Value Ref Range    Lactate (Lactic Acid) 1.1 0.5 - 2.2 mmol/L   CK    Collection Time: 02/18/19  5:57 AM   Result Value Ref Range     (H) 20 - 200 U/L   Brain natriuretic peptide    Collection Time: 02/18/19  5:57 AM   Result Value Ref Range    BNP 16 0 - 99 pg/mL   PT/INR    Collection Time: 02/18/19  5:57 AM   Result Value Ref Range    Prothrombin Time 10.2 9.0 - 12.5 sec    INR 1.0 0.8 - 1.2   PTT    Collection Time: 02/18/19  5:57 AM   Result Value Ref Range    aPTT 23.8 21.0 - 32.0 sec   Osmolality, Serum    Collection Time: 02/18/19  5:57 AM   Result Value Ref Range    Osmolality 278 (L) 280 - 300 mOsm/kg   Vitamin B12    Collection Time: 02/18/19  5:57 AM   Result Value Ref Range    Vitamin B-12 497 210 - 950 pg/mL   Folate    Collection Time: 02/18/19  5:57  AM   Result Value Ref Range    Folate 11.8 4.0 - 24.0 ng/mL   POCT glucose    Collection Time: 02/18/19  6:02 AM   Result Value Ref Range    POCT Glucose 96 70 - 110 mg/dL   Sodium, urine, random    Collection Time: 02/18/19  6:50 AM   Result Value Ref Range    Sodium Urine Random 54 20 - 250 mmol/L   Basic metabolic panel    Collection Time: 02/18/19  8:55 AM   Result Value Ref Range    Sodium 131 (L) 136 - 145 mmol/L    Potassium 3.8 3.5 - 5.1 mmol/L    Chloride 100 95 - 110 mmol/L    CO2 22 (L) 23 - 29 mmol/L    Glucose 88 70 - 110 mg/dL    BUN, Bld 14 8 - 23 mg/dL    Creatinine 0.8 0.5 - 1.4 mg/dL    Calcium 8.9 8.7 - 10.5 mg/dL    Anion Gap 9 8 - 16 mmol/L    eGFR if African American >60.0 >60 mL/min/1.73 m^2    eGFR if non African American >60.0 >60 mL/min/1.73 m^2     All pertinent labs within the past 24 hours have been reviewed.    Significant Imaging:   Chest X-ray, PA And Lateral   Final Result      As above.         Electronically signed by: Madhavi Doe MD   Date:    02/18/2019   Time:    06:46      MRI Cervical Spine W WO Cont   Final Result      1. Mild degenerative change of the cervical spine as detailed above.   2. 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         Electronically signed by: Madhavi Doe MD   Date:    02/18/2019   Time:    04:38        I have reviewed all pertinent imaging results/findings within the past 24 hours.

## 2019-02-19 NOTE — DISCHARGE SUMMARY
[facility] Hospital Medicine  Discharge Summary      Patient Name: Gurinder Tee  MRN: [id number]  Admission Date: 2019  Hospital Length of Stay: 1 days  Discharge Date and Time: 2019  7:53 PM  Attending Physician: Quinton Louie MD  Discharging Provider: José Miguel Hdz MD  Primary Care Provider: Primary Doctor [facility] Medicine Team: [facility] HOSP MED 3 José Miguel Hdz MD    HPI:   59-year-old male with no known past medical history who presents as a transfer from [facility] in [place] for neurosurgical evaluation. Patient reports he began having lower back spasms starting 1-2 weeks ago; he had a CT of his spine which revealed an incidental right lung mass. He was given Tramadol and Flexeril for back pain. He states on Tuesday (6 days ago) he was attending his follow-up consultation with a pulmonologist when he noted gait instability and lower extremity numbness. An MRI of the spine was done demonstrating a lesion at L3. He states since then he has required more assistance with ambulation and has numbness from the lumbar spine to his toes. Now he can not stand without holding onto something. He denies arm weakness or numbness, bowel/bladder incontinence, back pain, facial droop, speech difficulty, chest pain, nausea, vomiting, or dysuria.    Smoking History:  Patient quitted smoking for 20 years. He has been a smoker for 20 years    Family History:  His maternal grandfather  of lung cancer.    * No surgery found *      Hospital Course:   Patient was seen and evaluated by NSGY [facility] and plan is: No acute neurosurgical intervention necessary as the MRI L spine obtained at [facility] was without compressive lesion.  Patient also seen and evaluated by Pulmonology expressed his wish to resume care for his pulmonary mass with biopsy in [place]. The patient was then transferred back to his original hospital..      Consults:   Consults (From admission, onward)         Status Ordering Provider     Inpatient consult to Neurosurgery  Once     Provider:  (Not yet assigned)    Acknowledged NICOL CRANE     Inpatient consult to Pulmonology  Once     Provider:  (Not yet assigned)    Acknowledged JERONIMO EID          Review of Systems   Constitutional: Positive for activity change and fatigue. Negative for appetite change, chills, diaphoresis, fever and unexpected weight change.   HENT: Negative for sore throat and trouble swallowing.    Eyes: Negative for photophobia, pain, discharge, redness, itching and visual disturbance.   Respiratory: Negative for cough, choking, chest tightness, shortness of breath, wheezing and stridor.    Cardiovascular: Negative for chest pain and leg swelling.   Gastrointestinal: Positive for constipation. Negative for abdominal pain, diarrhea, nausea and vomiting.   Genitourinary: Negative for dysuria and hematuria.        Has the urge to pass urine. Takes longer than usual to pass urine   Neurological: Positive for dizziness, weakness, light-headedness and numbness. Negative for seizures, syncope, speech difficulty and headaches.   Psychiatric/Behavioral: Negative for confusion.     Objective:     Vital Signs (Most Recent):  Temp: 98.6 °F (37 °C) (02/18/19 1730)  Pulse: 90 (02/18/19 1730)  Resp: 18 (02/18/19 1730)  BP: (!) 192/122(provider with Internal Medicine Team 3 paged) (02/18/19 1730)  SpO2: 99 % (02/18/19 1730) Vital Signs (24h Range):  Temp:  [97.8 °F (36.6 °C)-98.6 °F (37 °C)] 98.6 °F (37 °C)  Pulse:  [78-90] 90  Resp:  [18] 18  SpO2:  [95 %-100 %] 99 %  BP: (119-192)/() 192/122     Weight: 106.6 kg (235 lb)  Body mass index is 31.87 kg/m².    Intake/Output Summary (Last 24 hours) at 2/18/2019 2054  Last data filed at 2/18/2019 1700  Gross per 24 hour   Intake 480 ml   Output 375 ml   Net 105 ml      Physical Exam   Constitutional: He is oriented to person, place, and time. He appears well-developed and well-nourished. No distress.    HENT:   Head: Normocephalic and atraumatic.   Eyes: Right eye exhibits no discharge. Left eye exhibits no discharge.   Neck: Neck supple. No JVD present.   Cardiovascular: Normal rate and regular rhythm. Exam reveals no gallop and no friction rub.   No murmur heard.  Pulmonary/Chest: Effort normal and breath sounds normal. No stridor. No respiratory distress. He has no wheezes. He has no rales. He exhibits no tenderness.   Abdominal: Soft. Bowel sounds are normal. There is no tenderness.   Musculoskeletal: He exhibits no edema, tenderness or deformity.   Neurological: He is alert and oriented to person, place, and time.   Hypoesthesia noted from the level of the lumbar spine to bilateral legs.   No saddle anesthesia.  Normal finger-to-nose, unable to perform heel-to-shin 2/2 weakness.   BLE pronator drift.   5/5 dorsi/plantarflexion, knee flexion bilaterally.   Skin: He is not diaphoretic.   Psychiatric: He has a normal mood and affect. His behavior is normal.       Significant Labs:   Recent Results (from the past 48 hour(s))   APTT    Collection Time: 02/18/19  1:39 AM   Result Value Ref Range    aPTT 22.9 21.0 - 32.0 sec   Protime-INR    Collection Time: 02/18/19  1:39 AM   Result Value Ref Range    Prothrombin Time 10.1 9.0 - 12.5 sec    INR 1.0 0.8 - 1.2   Type & Screen    Collection Time: 02/18/19  1:39 AM   Result Value Ref Range    Group & Rh A POS     Indirect Sary NEG    Comprehensive metabolic panel    Collection Time: 02/18/19  1:39 AM   Result Value Ref Range    Sodium 131 (L) 136 - 145 mmol/L    Potassium 3.5 3.5 - 5.1 mmol/L    Chloride 100 95 - 110 mmol/L    CO2 22 (L) 23 - 29 mmol/L    Glucose 96 70 - 110 mg/dL    BUN, Bld 15 8 - 23 mg/dL    Creatinine 0.8 0.5 - 1.4 mg/dL    Calcium 8.9 8.7 - 10.5 mg/dL    Total Protein 6.8 6.0 - 8.4 g/dL    Albumin 3.0 (L) 3.5 - 5.2 g/dL    Total Bilirubin 0.7 0.1 - 1.0 mg/dL    Alkaline Phosphatase 88 55 - 135 U/L    AST 24 10 - 40 U/L    ALT 22 10 - 44 U/L     Anion Gap 9 8 - 16 mmol/L    eGFR if African American >60.0 >60 mL/min/1.73 m^2    eGFR if non African American >60.0 >60 mL/min/1.73 m^2   CBC auto differential    Collection Time: 02/18/19  1:39 AM   Result Value Ref Range    WBC 12.24 3.90 - 12.70 K/uL    RBC 4.90 4.60 - 6.20 M/uL    Hemoglobin 13.5 (L) 14.0 - 18.0 g/dL    Hematocrit 41.7 40.0 - 54.0 %    MCV 85 82 - 98 fL    MCH 27.6 27.0 - 31.0 pg    MCHC 32.4 32.0 - 36.0 g/dL    RDW 13.2 11.5 - 14.5 %    Platelets 242 150 - 350 K/uL    MPV 10.3 9.2 - 12.9 fL    Immature Granulocytes 0.4 0.0 - 0.5 %    Gran # (ANC) 9.5 (H) 1.8 - 7.7 K/uL    Immature Grans (Abs) 0.05 (H) 0.00 - 0.04 K/uL    Lymph # 1.9 1.0 - 4.8 K/uL    Mono # 0.8 0.3 - 1.0 K/uL    Eos # 0.1 0.0 - 0.5 K/uL    Baso # 0.07 0.00 - 0.20 K/uL    nRBC 0 0 /100 WBC    Gran% 77.2 (H) 38.0 - 73.0 %    Lymph% 15.2 (L) 18.0 - 48.0 %    Mono% 6.1 4.0 - 15.0 %    Eosinophil% 0.5 0.0 - 8.0 %    Basophil% 0.6 0.0 - 1.9 %    Differential Method Automated    Hemoglobin A1c    Collection Time: 02/18/19  5:57 AM   Result Value Ref Range    Hemoglobin A1C 5.4 4.0 - 5.6 %    Estimated Avg Glucose 108 68 - 131 mg/dL   TSH    Collection Time: 02/18/19  5:57 AM   Result Value Ref Range    TSH 3.440 0.400 - 4.000 uIU/mL   Procalcitonin    Collection Time: 02/18/19  5:57 AM   Result Value Ref Range    Procalcitonin 0.03 <0.25 ng/mL   Lactic acid, plasma    Collection Time: 02/18/19  5:57 AM   Result Value Ref Range    Lactate (Lactic Acid) 1.1 0.5 - 2.2 mmol/L   CK    Collection Time: 02/18/19  5:57 AM   Result Value Ref Range     (H) 20 - 200 U/L   Brain natriuretic peptide    Collection Time: 02/18/19  5:57 AM   Result Value Ref Range    BNP 16 0 - 99 pg/mL   PT/INR    Collection Time: 02/18/19  5:57 AM   Result Value Ref Range    Prothrombin Time 10.2 9.0 - 12.5 sec    INR 1.0 0.8 - 1.2   PTT    Collection Time: 02/18/19  5:57 AM   Result Value Ref Range    aPTT 23.8 21.0 - 32.0 sec   Osmolality, Serum     Collection Time: 02/18/19  5:57 AM   Result Value Ref Range    Osmolality 278 (L) 280 - 300 mOsm/kg   Vitamin B12    Collection Time: 02/18/19  5:57 AM   Result Value Ref Range    Vitamin B-12 497 210 - 950 pg/mL   Folate    Collection Time: 02/18/19  5:57 AM   Result Value Ref Range    Folate 11.8 4.0 - 24.0 ng/mL   POCT glucose    Collection Time: 02/18/19  6:02 AM   Result Value Ref Range    POCT Glucose 96 70 - 110 mg/dL   Sodium, urine, random    Collection Time: 02/18/19  6:50 AM   Result Value Ref Range    Sodium Urine Random 54 20 - 250 mmol/L   Basic metabolic panel    Collection Time: 02/18/19  8:55 AM   Result Value Ref Range    Sodium 131 (L) 136 - 145 mmol/L    Potassium 3.8 3.5 - 5.1 mmol/L    Chloride 100 95 - 110 mmol/L    CO2 22 (L) 23 - 29 mmol/L    Glucose 88 70 - 110 mg/dL    BUN, Bld 14 8 - 23 mg/dL    Creatinine 0.8 0.5 - 1.4 mg/dL    Calcium 8.9 8.7 - 10.5 mg/dL    Anion Gap 9 8 - 16 mmol/L    eGFR if African American >60.0 >60 mL/min/1.73 m^2    eGFR if non African American >60.0 >60 mL/min/1.73 m^2     All pertinent labs within the past 24 hours have been reviewed.    Significant Imaging:   Chest X-ray, PA And Lateral   Final Result      As above.         Electronically signed by: Madhavi Doe MD   Date:    02/18/2019   Time:    06:46      MRI Cervical Spine W WO Cont   Final Result      1. Mild degenerative change of the cervical spine as detailed above.   2. 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         Electronically signed by: Madhavi oDe MD   Date:    02/18/2019   Time:    04:38        I have reviewed all pertinent imaging results/findings within the past 24 hours.    * Back pain    Patient has progressively worsening back pain started 1 week ago that is associated with gait disturbance and numbness of both LEs. Patient has difficulty urination and constipation which could be related to neurological deficit or  tramadol use. MRI obtained and NRSGY consulted. [place] their is no role for this patient as spinal cord compression was not present.     Plan:  - Transfer back to original hospital   - PRN pain medications and muscle relaxants  - PT/OT       Hyponatremia    Patient is euvolemic in physical exam our differential diagnoses include SIADH due to lung mass. Will send for serum osmolality and urine Na to determine hydration vs fluid restriction. Will monitor his BMP.    - will continue care in [place] with lung biopsy for further evaluation of lung mass     Mass of right lung    - will continue care in [place] with lung biopsy for further evaluation of lung mass         Final Active Diagnoses:    Diagnosis Date Noted POA    PRINCIPAL PROBLEM:  Back pain [M54.9] 02/18/2019 Yes    Mass of right lung [R91.8] 02/18/2019 Yes    Hyponatremia [E87.1] 02/18/2019 Unknown      Problems Resolved During this Admission:       Discharged Condition: fair    Disposition: Discharged to Other Faci*    Follow Up:    Patient Instructions:      Diet Adult Regular     Notify your health care provider if you experience any of the following:  temperature >100.4     Notify your health care provider if you experience any of the following:  persistent nausea and vomiting or diarrhea     Notify your health care provider if you experience any of the following:  severe uncontrolled pain     Notify your health care provider if you experience any of the following:  redness, tenderness, or signs of infection (pain, swelling, redness, odor or green/yellow discharge around incision site)     Notify your health care provider if you experience any of the following:  difficulty breathing or increased cough     Notify your health care provider if you experience any of the following:  severe persistent headache     Notify your health care provider if you experience any of the following:  worsening rash     Notify your health care provider if you experience  any of the following:  persistent dizziness, light-headedness, or visual disturbances     Notify your health care provider if you experience any of the following:  increased confusion or weakness     Notify your health care provider if you experience any of the following:     Activity as tolerated     Pending Diagnostic Studies:     None         Medications:  Reconciled Home Medications:      Medication List      CONTINUE taking these medications    acetaminophen 500 MG tablet  Commonly known as:  TYLENOL  Take 500 mg by mouth daily as needed.     ADVIL ORAL  Take 1 tablet by mouth daily as needed (leg pain).     cyclobenzaprine 5 MG tablet  Commonly known as:  FLEXERIL  Take 5 mg by mouth 3 (three) times daily as needed for Muscle spasms.     lidocaine 5 %  Commonly known as:  LIDODERM  Place 1 patch onto the skin every 24 hours. Remove & Discard patch within 12 hours or as directed by MD     tramadol 25 mg tablet  Commonly known as:  ULTRAM  Take by mouth every 6 (six) hours as needed for Pain.     zolpidem 5 MG Tab  Commonly known as:  AMBIEN  Take 5 mg by mouth nightly as needed.            Indwelling Lines/Drains at time of discharge:   Lines/Drains/Airways          None          Time spent on the discharge of patient: 60 minutes  Patient was seen and examined on the date of discharge and determined to be suitable for discharge.         José Miguel Hdz MD  Department of Hospital Medicine  [facility]